# Patient Record
Sex: FEMALE | Race: WHITE | HISPANIC OR LATINO | Employment: FULL TIME | URBAN - METROPOLITAN AREA
[De-identification: names, ages, dates, MRNs, and addresses within clinical notes are randomized per-mention and may not be internally consistent; named-entity substitution may affect disease eponyms.]

---

## 2017-08-14 ENCOUNTER — OFFICE VISIT (OUTPATIENT)
Dept: URGENT CARE | Facility: PHYSICIAN GROUP | Age: 47
End: 2017-08-14
Payer: COMMERCIAL

## 2017-08-14 ENCOUNTER — HOSPITAL ENCOUNTER (OUTPATIENT)
Dept: RADIOLOGY | Facility: MEDICAL CENTER | Age: 47
End: 2017-08-14
Attending: FAMILY MEDICINE
Payer: COMMERCIAL

## 2017-08-14 VITALS
OXYGEN SATURATION: 96 % | BODY MASS INDEX: 29.99 KG/M2 | RESPIRATION RATE: 18 BRPM | HEART RATE: 82 BPM | WEIGHT: 180 LBS | TEMPERATURE: 98 F | HEIGHT: 65 IN | DIASTOLIC BLOOD PRESSURE: 82 MMHG | SYSTOLIC BLOOD PRESSURE: 108 MMHG

## 2017-08-14 DIAGNOSIS — M79.661 RIGHT CALF PAIN: ICD-10-CM

## 2017-08-14 DIAGNOSIS — M25.561 ACUTE PAIN OF RIGHT KNEE: ICD-10-CM

## 2017-08-14 DIAGNOSIS — M70.50 ANSERINE BURSITIS: ICD-10-CM

## 2017-08-14 PROCEDURE — 99214 OFFICE O/P EST MOD 30 MIN: CPT | Performed by: FAMILY MEDICINE

## 2017-08-14 PROCEDURE — 93971 EXTREMITY STUDY: CPT | Mod: RT

## 2017-08-14 PROCEDURE — 73564 X-RAY EXAM KNEE 4 OR MORE: CPT | Mod: RT

## 2017-08-14 NOTE — Clinical Note
August 14, 2017         Patient: Clara Coombs   YOB: 1970   Date of Visit: 8/14/2017           To Whom it May Concern:    Clara Coombs was seen in my clinic on 8/14/2017. Please excuse 8/15 and 8/16/2017.      Sincerely,           Ned John M.D.  Electronically Signed

## 2017-08-14 NOTE — MR AVS SNAPSHOT
"        Clara Coombs   2017 4:45 PM   Office Visit   MRN: [id number]    Department:  [facility]   Dept Phone:  [phone]    Description:  Female : 1970   Provider:  Ned John M.D.           Reason for Visit     Leg Swelling R leg swelling x2 wks, fell x2 months ago      Allergies as of 2017     No Known Allergies      You were diagnosed with     Acute pain of right knee   [5188071]       Right calf pain   [[id number]]         Vital Signs     Blood Pressure Pulse Temperature Respirations Height Weight    108/82 mmHg 82 36.7 °C (98 °F) 18 1.651 m (5' 5\") 81.647 kg (180 lb)    Body Mass Index Oxygen Saturation Smoking Status             29.95 kg/m2 96% Never Smoker          Basic Information     Date Of Birth Sex Race Ethnicity Preferred Language    1970 Female White  Origin (Rwandan,Nigerien,Turkish,Gab, etc) English      Your appointments     Aug 14, 2017  6:00 PM   US EXTREMITY (30) A with VISTA US 2   IMAGING VISTA ([place])    [address] [facility] [id number] [phone]              Health Maintenance        Date Due Completion Dates    IMM DTaP/Tdap/Td Vaccine (1 - Tdap) 1988 ---    PAP SMEAR 1990 ---    MAMMOGRAM 2017    IMM INFLUENZA (1) 2017 ---            Current Immunizations     No immunizations on file.      Below and/or attached are the medications your provider expects you to take. Review all of your home medications and newly ordered medications with your provider and/or pharmacist. Follow medication instructions as directed by your provider and/or pharmacist. Please keep your medication list with you and share with your provider. Update the information when medications are discontinued, doses are changed, or new medications (including over-the-counter products) are added; and carry medication information at all times in the event of emergency situations     Allergies:  No Known Allergies          Medications  Valid as " of: August 14, 2017 -  5:28 PM    Generic Name Brand Name Tablet Size Instructions for use    Tobramycin (Solution) TOBREX 0.3 % Place 1 Drop in both eyes 4 times a day.        .                 Medicines prescribed today were sent to:     [facility] [address] [place] [address], [place] - [address] [facility]    [address] [facility] [address]    Phone: [phone] Fax: [phone]    Open 24 Hours?: No      Medication refill instructions:       If your prescription bottle indicates you have medication refills left, it is not necessary to call your provider’s office. Please contact your pharmacy and they will refill your medication.    If your prescription bottle indicates you do not have any refills left, you may request refills at any time through one of the following ways: The online Robot App Store system (except Urgent Care), by calling your provider’s office, or by asking your pharmacy to contact your provider’s office with a refill request. Medication refills are processed only during regular business hours and may not be available until the next business day. Your provider may request additional information or to have a follow-up visit with you prior to refilling your medication.   *Please Note: Medication refills are assigned a new Rx number when refilled electronically. Your pharmacy may indicate that no refills were authorized even though a new prescription for the same medication is available at the pharmacy. Please request the medicine by name with the pharmacy before contacting your provider for a refill.        Your To Do List     Future Labs/Procedures Complete By Expires    DX-KNEE COMPLETE 4+ RIGHT  As directed 8/14/2018    US-EXTREMITY VENOUS UNILATERAL-LOWER RIGHT  As directed 8/14/2018         Robot App Store Access Code: [email or web address]  Expires: 9/13/2017  5:28 PM    Your email address is not on file at [facility].  Email Addresses are required for you to sign up for Robot App Store, please contact [phone]  to verify your personal information and to provide your email address prior to attempting to register for OfficeDropt[place] [address]    CourseNetworkinghart  A secure, online tool to manage your health information     [employer]’s VaxCare® is a secure, online tool that connects you to your personalized health information from the privacy of your home -- day or night - making it very easy for you to manage your healthcare. Once the activation process is completed, you can even access your medical information using the VaxCare millie, which is available for free in the Apple Millie store or Google Play store.     To learn more about VaxCare, visit [email or web address]    There are two levels of access available (as shown below):   My Chart Features  [facility] Primary Care Doctor [facility]  Specialists [facility]  Urgent  Care Non-[facility] Primary Care Doctor   Email your healthcare team securely and privately 24/7 X X X    Manage appointments: schedule your next appointment; view details of past/upcoming appointments X      Request prescription refills. X      View recent personal medical records, including lab and immunizations X X X X   View health record, including health history, allergies, medications X X X X   Read reports about your outpatient visits, procedures, consult and ER notes X X X X   See your discharge summary, which is a recap of your hospital and/or ER visit that includes your diagnosis, lab results, and care plan X X  X     How to register for OfficeDropt:  Once your e-mail address has been verified, follow the following steps to sign up for OfficeDropt.     1. Go to  [email or web address]  2. Click on the Sign Up Now box, which takes you to the New Member Sign Up page. You will need to provide the following information:  a. Enter your VaxCare Access Code exactly as it appears at the top of this page. (You will not need to use this code after you’ve completed the sign-up process. If you do  not sign up before the expiration date, you must request a new code.)   b. Enter your date of birth.   c. Enter your home email address.   d. Click Submit, and follow the next screen’s instructions.  3. Create a CloudTags ID. This will be your CloudTags login ID and cannot be changed, so think of one that is secure and easy to remember.  4. Create a Rystot password. You can change your password at any time.  5. Enter your Password Reset Question and Answer. This can be used at a later time if you forget your password.   6. Enter your e-mail address. This allows you to receive e-mail notifications when new information is available in CloudTags.  7. Click Sign Up. You can now view your health information.    For assistance activating your CloudTags account, call [phone]

## 2017-08-15 ASSESSMENT — ENCOUNTER SYMPTOMS
SENSORY CHANGE: 0
HEMOPTYSIS: 0
FOCAL WEAKNESS: 0
WEIGHT LOSS: 0
LEG SWELLING: 1

## 2017-08-15 NOTE — PROGRESS NOTES
"Subjective:      Clara Coombs is a 47 y.o. female who presents with Leg Swelling          Family member translating.   Leg Swelling  This is a new problem. Episode onset: 2 weeks medial knee pain, leg swelling. Twisting injury 2 months ago. Walks a significant amount daily. Pain is moderate in severity. No CP/SOB. No PMH/FH DVT/thrombophilia.  Pertinent negatives include no rash.       Review of Systems   Constitutional: Negative for weight loss and malaise/fatigue.   Respiratory: Negative for hemoptysis.    Musculoskeletal:        No hip or ankle pain   Skin: Negative for itching and rash.   Neurological: Negative for sensory change and focal weakness.   .  Medications, Allergies, and current problem list reviewed today in Epic         Objective:     /82 mmHg  Pulse 82  Temp(Src) 36.7 °C (98 °F)  Resp 18  Ht 1.651 m (5' 5\")  Wt 81.647 kg (180 lb)  BMI 29.95 kg/m2  SpO2 96%     Physical Exam   Constitutional: She appears well-developed and well-nourished. No distress.   Musculoskeletal:   Right knee: tender medial aspect proximal leg at anatomic location of anserine bursa. No fluctuance or fluid collection appreciated. No joint line tenderness. FROM. Stable. No effusion.     Right calf mildly tender and appear slightly swollen compared to left.    Neurological:   Speech is clear. Patient is appropriate and cooperative. C   Skin: Skin is warm and dry. No rash noted.               Assessment/Plan:   Xray knee negative  US Left lower extremity negative    1. Acute pain of right knee    - DX-KNEE COMPLETE 4+ RIGHT; Future    2. Right calf pain    - US-EXTREMITY VENOUS UNILATERAL-LOWER RIGHT; Future    3. Anserine bursitis    - REFERRAL TO SPORTS MEDICINE    Relative rest, ice, nsaid prn. Elevation and compression prn swelling. Resume activity as tolerated.  Differential diagnosis, natural history, supportive care, and indications for immediate follow-up discussed at length.         "

## 2017-08-21 ENCOUNTER — OFFICE VISIT (OUTPATIENT)
Dept: MEDICAL GROUP | Facility: CLINIC | Age: 47
End: 2017-08-21
Payer: COMMERCIAL

## 2017-08-21 VITALS
OXYGEN SATURATION: 95 % | BODY MASS INDEX: 29.99 KG/M2 | SYSTOLIC BLOOD PRESSURE: 116 MMHG | WEIGHT: 180 LBS | DIASTOLIC BLOOD PRESSURE: 78 MMHG | RESPIRATION RATE: 18 BRPM | TEMPERATURE: 97.5 F | HEIGHT: 65 IN | HEART RATE: 80 BPM

## 2017-08-21 DIAGNOSIS — M67.951 TENDINOPATHY OF RIGHT GLUTEUS MEDIUS: ICD-10-CM

## 2017-08-21 DIAGNOSIS — M22.2X1 PATELLOFEMORAL PAIN SYNDROME OF RIGHT KNEE: ICD-10-CM

## 2017-08-21 DIAGNOSIS — M62.81 QUADRICEPS WEAKNESS: ICD-10-CM

## 2017-08-21 PROCEDURE — 99203 OFFICE O/P NEW LOW 30 MIN: CPT | Performed by: FAMILY MEDICINE

## 2017-08-21 ASSESSMENT — PATIENT HEALTH QUESTIONNAIRE - PHQ9: CLINICAL INTERPRETATION OF PHQ2 SCORE: 0

## 2017-08-21 NOTE — MR AVS SNAPSHOT
"        Clara Coombs   2017 8:30 AM   Office Visit   MRN: [id number]    Department:  [facility]   Dept Phone:  [phone]    Description:  Female : 1970   Provider:  Abdon Baltazar M.D.           Reason for Visit     Knee Pain Referral from UC/ R knee bursitis       Allergies as of 2017     No Known Allergies      You were diagnosed with     Patellofemoral pain syndrome of right knee   [[id number]]       Tendinopathy of right gluteus medius   [[id number]]       Quadriceps weakness   [[id number]]         Vital Signs     Blood Pressure Pulse Temperature Respirations Height Weight    116/78 mmHg 80 36.4 °C (97.5 °F) 18 1.651 m (5' 5\") 81.647 kg (180 lb)    Body Mass Index Oxygen Saturation Smoking Status             29.95 kg/m2 95% Never Smoker          Basic Information     Date Of Birth Sex Race Ethnicity Preferred Language    1970 Female White  Origin (Turkish,Citizen of [place],Bulgarian,Anguillan, etc) Turkish      Health Maintenance        Date Due Completion Dates    IMM DTaP/Tdap/Td Vaccine (1 - Tdap) 1988 ---    PAP SMEAR 1990 ---    IMM INFLUENZA (1) 2017 ---    MAMMOGRAM 2017            Current Immunizations     No immunizations on file.      Below and/or attached are the medications your provider expects you to take. Review all of your home medications and newly ordered medications with your provider and/or pharmacist. Follow medication instructions as directed by your provider and/or pharmacist. Please keep your medication list with you and share with your provider. Update the information when medications are discontinued, doses are changed, or new medications (including over-the-counter products) are added; and carry medication information at all times in the event of emergency situations     Allergies:  No Known Allergies          Medications  Valid as of: 2017 -  9:06 AM    Generic Name Brand Name Tablet Size Instructions for use   " Tobramycin (Solution) TOBREX 0.3 % Place 1 Drop in both eyes 4 times a day.        .                 Medicines prescribed today were sent to:     [facility] [address] [place] [address] [address] [facility]    [address] [facility] [address]    Phone: [phone] Fax: [phone]    Open 24 Hours?: No      Medication refill instructions:       If your prescription bottle indicates you have medication refills left, it is not necessary to call your provider’s office. Please contact your pharmacy and they will refill your medication.    If your prescription bottle indicates you do not have any refills left, you may request refills at any time through one of the following ways: The online Biosensia system (except Urgent Care), by calling your provider’s office, or by asking your pharmacy to contact your provider’s office with a refill request. Medication refills are processed only during regular business hours and may not be available until the next business day. Your provider may request additional information or to have a follow-up visit with you prior to refilling your medication.   *Please Note: Medication refills are assigned a new Rx number when refilled electronically. Your pharmacy may indicate that no refills were authorized even though a new prescription for the same medication is available at the pharmacy. Please request the medicine by name with the pharmacy before contacting your provider for a refill.        Referral     A referral request has been sent to our patient care coordination department. Please allow 3-5 business days for us to process this request and contact you either by phone or mail. If you do not hear from us by the 5th business day, please call us at [phone].           Biosensia Access Code: [email or web address]  Expires: 9/13/2017  5:28 PM    Biosensia  A secure, online tool to manage your health information     [employer]’s Biosensia® is a secure, online tool that connects you to  your personalized health information from the privacy of your home -- day or night - making it very easy for you to manage your healthcare. Once the activation process is completed, you can even access your medical information using the Fabulyzer millie, which is available for free in the Apple Millie store or Google Play store.     Fabulyzer provides the following levels of access (as shown below):   My Chart Features   [facility] Primary Care Doctor [facility]  Specialists [facility]  Urgent  Care Non-[facility]  Primary Care  Doctor   Email your healthcare team securely and privately 24/7 X X X    Manage appointments: schedule your next appointment; view details of past/upcoming appointments X      Request prescription refills. X      View recent personal medical records, including lab and immunizations X X X X   View health record, including health history, allergies, medications X X X X   Read reports about your outpatient visits, procedures, consult and ER notes X X X X   See your discharge summary, which is a recap of your hospital and/or ER visit that includes your diagnosis, lab results, and care plan. X X       How to register for Fabulyzer:  1. Go to  [email or web address].  2. Click on the Sign Up Now box, which takes you to the New Member Sign Up page. You will need to provide the following information:  a. Enter your Fabulyzer Access Code exactly as it appears at the top of this page. (You will not need to use this code after you’ve completed the sign-up process. If you do not sign up before the expiration date, you must request a new code.)   b. Enter your date of birth.   c. Enter your home email address.   d. Click Submit, and follow the next screen’s instructions.  3. Create a Fabulyzer ID. This will be your Fabulyzer login ID and cannot be changed, so think of one that is secure and easy to remember.  4. Create a Fabulyzer password. You can change your password at any time.  5. Enter your Password Reset Question and Answer.  This can be used at a later time if you forget your password.   6. Enter your e-mail address. This allows you to receive e-mail notifications when new information is available in [employer].  7. Click Sign Up. You can now view your health information.    For assistance activating your [employer] account, call [phone]

## 2017-08-21 NOTE — PROGRESS NOTES
"CHIEF COMPLAINT:  Clara Coombs female presenting at the request of Ned John M.D. for evaluation of knee pain.     Clara Coombs is complaining of right knee pain  present for 2 months  Pain is at the anterior, anteromedial knee  Quality is aching, sharp, sharp pain is intermittent  Pain is non-radiating   Improved with resting  Aggravated by stairs, worse going up  no prior problems with this area in the past, previous surgery of RIGHT bunion surgery (2011)   Prior Treatments: none  Prior studies: X-Ray   Medications tried for pain include: ibuprofen (OTC)  Mechanical Symptom history: No Locking    REVIEW OF SYSTEMS  No Nausea, No Vomiting, No Chest Pain, No Shortness of Breath, No Dizziness, No Headache      PAST MEDICAL HISTORY:   History reviewed. No pertinent past medical history.    PMH:  has no past medical history on file.  MEDS:   Current outpatient prescriptions:   •  tobramycin (TOBREX) 0.3 % Solution ophthalmic solution, Place 1 Drop in both eyes 4 times a day., Disp: 1 Bottle, Rfl: 0  ALLERGIES: No Known Allergies  SURGHX:   Past Surgical History   Procedure Laterality Date   • Cholecystectomy     • Appendectomy     • Tubal coagulation laparoscopic bilateral     • Bunionectomy Right    • Primary c section       SOCHX:  reports that she has never smoked. She has never used smokeless tobacco. She reports that she drinks alcohol.  FH: Family history was reviewed, no pertinent findings to report     PHYSICAL EXAM:  /78 mmHg  Pulse 80  Temp(Src) 36.4 °C (97.5 °F)  Resp 18  Ht 1.651 m (5' 5\")  Wt 81.647 kg (180 lb)  BMI 29.95 kg/m2  SpO2 95%     slightly overweight in no apparent distress, alert and oriented x 3.  Gait: antalgic     RIGHT Knee:  Slight Varus and No Swelling  Range of Motion Intact  1+ effusion  Patellar Medial facet tenderness, Lateral facet tenderness and Extensor mechanism intact  Medial Joint Line Tenderness and NEGATIVE Carmen  Lateral Joint Line Non-tender and " NEGATIVE Carmen  Trace Laxity with Varus stress  Trace Laxity with Valgus stress  Lachman's testing is Trace  Posterior Drawer Testing is Trace  The leg is otherwise neurovascularly intact    LEFT Knee:  Slight Varus and No Swelling   Range of Motion Intact  Trace effusion  Patellar No tenderness and no apprehension  Medial Joint Line Non-tender and NEGATIVE Carmen  Lateral Joint Line Non-tender and NEGATIVE Carmen  Trace Laxity with Varus stress  Trace Laxity with Valgus stress  Lachman's testing is Trace  Posterior Drawer Testing is Trace  The leg is otherwise neurovascularly intact    Additional Findings: Tight hamstrings and Tender Gluteus medius      1. Patellofemoral pain syndrome of right knee  REFERRAL TO PHYSICAL THERAPY Reason for Therapy: Eval/Treat/Report   2. Tendinopathy of right gluteus medius     3. Quadriceps weakness       PT  Knee brace in office today  Demonstrated glut med strengthening in office today    Return in about 4 weeks (around 9/18/2017).                                                    Results for orders placed during the hospital encounter of 08/14/17   DX-KNEE COMPLETE 4+ RIGHT    Impression Unremarkable knee series.            INTERPRETING LOCATION:  [address] [place] [address], [id number]                                          done elsewhere and reviewed independently by me, demonstrates POSITIVE lateralization of the patella      Thank you Ned John M.D. for allowing me to participate in caring for your patient.      ADDENDUM:  September 11, 2017  Received notification from physical therapy that patient did not schedule

## 2020-02-26 ENCOUNTER — HOSPITAL ENCOUNTER (EMERGENCY)
Facility: MEDICAL CENTER | Age: 50
End: 2020-02-26
Attending: EMERGENCY MEDICINE
Payer: COMMERCIAL

## 2020-02-26 ENCOUNTER — APPOINTMENT (OUTPATIENT)
Dept: RADIOLOGY | Facility: MEDICAL CENTER | Age: 50
End: 2020-02-26
Attending: EMERGENCY MEDICINE
Payer: COMMERCIAL

## 2020-02-26 VITALS
BODY MASS INDEX: 31.39 KG/M2 | HEART RATE: 80 BPM | SYSTOLIC BLOOD PRESSURE: 140 MMHG | HEIGHT: 66 IN | DIASTOLIC BLOOD PRESSURE: 78 MMHG | WEIGHT: 195.33 LBS | TEMPERATURE: 98.7 F | OXYGEN SATURATION: 99 % | RESPIRATION RATE: 16 BRPM

## 2020-02-26 DIAGNOSIS — S09.90XA CLOSED HEAD INJURY, INITIAL ENCOUNTER: ICD-10-CM
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
## 2020-02-26 DIAGNOSIS — R07.9 CHEST PAIN, UNSPECIFIED TYPE: ICD-10-CM

## 2020-02-26 PROCEDURE — A9270 NON-COVERED ITEM OR SERVICE: HCPCS | Performed by: EMERGENCY MEDICINE

## 2020-02-26 PROCEDURE — 71045 X-RAY EXAM CHEST 1 VIEW: CPT

## 2020-02-26 PROCEDURE — 700102 HCHG RX REV CODE 250 W/ 637 OVERRIDE(OP): Performed by: EMERGENCY MEDICINE

## 2020-02-26 PROCEDURE — 99284 EMERGENCY DEPT VISIT MOD MDM: CPT

## 2020-02-26 RX ORDER — ACETAMINOPHEN 325 MG/1
975 TABLET ORAL ONCE
Status: COMPLETED | OUTPATIENT
Start: 2020-02-26 | End: 2020-02-26

## 2020-02-26 RX ADMIN — ACETAMINOPHEN 975 MG: 325 TABLET, FILM COATED ORAL at 21:02

## 2020-02-26 ASSESSMENT — PAIN DESCRIPTION - DESCRIPTORS: DESCRIPTORS: ACHING
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
## 2021-02-02 ENCOUNTER — TELEPHONE (OUTPATIENT)
Dept: SCHEDULING | Facility: IMAGING CENTER | Age: 51
End: 2021-02-02

## 2021-03-15 ENCOUNTER — TELEPHONE (OUTPATIENT)
Dept: MEDICAL GROUP | Facility: MEDICAL CENTER | Age: 51
End: 2021-03-15

## 2021-03-15 NOTE — TELEPHONE ENCOUNTER
VOICEMAIL  1. Caller Name: Clara Coombs       Call Back Number: There are no phone numbers on file.      2. Message: LVM regarding to her appointment on 3/29/21 with  to reschedule.

## 2021-03-22 ENCOUNTER — NON-PROVIDER VISIT (OUTPATIENT)
Dept: OCCUPATIONAL MEDICINE | Facility: CLINIC | Age: 51
End: 2021-03-22

## 2021-03-22 ENCOUNTER — OFFICE VISIT (OUTPATIENT)
Dept: OCCUPATIONAL MEDICINE | Facility: CLINIC | Age: 51
End: 2021-03-22

## 2021-03-22 VITALS
DIASTOLIC BLOOD PRESSURE: 82 MMHG | HEART RATE: 76 BPM | OXYGEN SATURATION: 98 % | HEIGHT: 65 IN | BODY MASS INDEX: 32.99 KG/M2 | SYSTOLIC BLOOD PRESSURE: 128 MMHG | TEMPERATURE: 97.8 F | RESPIRATION RATE: 16 BRPM | WEIGHT: 198 LBS

## 2021-03-22 DIAGNOSIS — Z02.1 PRE-EMPLOYMENT DRUG SCREENING: Primary | ICD-10-CM

## 2021-03-22 DIAGNOSIS — Z02.1 PRE-EMPLOYMENT HEALTH SCREENING EXAMINATION: ICD-10-CM

## 2021-03-22 LAB
AMP AMPHETAMINE: NORMAL
BREATH ALCOHOL COMMENT: 0
COC COCAINE: NORMAL
INT CON NEG: NORMAL
INT CON POS: NORMAL
MET METHAMPHETAMINES: NORMAL
OPI OPIATES: NORMAL
PCP PHENCYCLIDINE: NORMAL
POC BREATHALIZER: 0 PERCENT (ref 0–0.01)
POC DRUG COMMENT 753798-OCCUPATIONAL HEALTH: NORMAL
THC: NORMAL

## 2021-03-22 PROCEDURE — 82075 ASSAY OF BREATH ETHANOL: CPT | Performed by: NURSE PRACTITIONER

## 2021-03-22 PROCEDURE — 8915 PR COMPREHENSIVE PHYSICAL: Performed by: NURSE PRACTITIONER

## 2021-03-22 PROCEDURE — 92552 PURE TONE AUDIOMETRY AIR: CPT | Performed by: NURSE PRACTITIONER

## 2021-03-22 PROCEDURE — 80305 DRUG TEST PRSMV DIR OPT OBS: CPT | Performed by: NURSE PRACTITIONER

## 2022-04-29 ENCOUNTER — HOSPITAL ENCOUNTER (OUTPATIENT)
Dept: RADIOLOGY | Facility: MEDICAL CENTER | Age: 52
End: 2022-04-29
Attending: FAMILY MEDICINE
Payer: MEDICAID

## 2022-04-29 DIAGNOSIS — Z12.31 VISIT FOR SCREENING MAMMOGRAM: ICD-10-CM

## 2022-04-29 PROCEDURE — 77063 BREAST TOMOSYNTHESIS BI: CPT

## 2022-05-21 ENCOUNTER — OFFICE VISIT (OUTPATIENT)
Dept: URGENT CARE | Facility: PHYSICIAN GROUP | Age: 52
End: 2022-05-21
Payer: MEDICAID

## 2022-05-21 VITALS
WEIGHT: 162 LBS | SYSTOLIC BLOOD PRESSURE: 122 MMHG | DIASTOLIC BLOOD PRESSURE: 60 MMHG | BODY MASS INDEX: 25.43 KG/M2 | HEIGHT: 67 IN | HEART RATE: 77 BPM | RESPIRATION RATE: 16 BRPM | TEMPERATURE: 98.4 F | OXYGEN SATURATION: 98 %

## 2022-05-21 DIAGNOSIS — S29.012A MUSCLE STRAIN OF UPPER BACK: ICD-10-CM

## 2022-05-21 PROCEDURE — 99203 OFFICE O/P NEW LOW 30 MIN: CPT | Performed by: PHYSICIAN ASSISTANT

## 2022-05-21 RX ORDER — CYCLOBENZAPRINE HCL 5 MG
5-10 TABLET ORAL NIGHTLY PRN
Qty: 20 TABLET | Refills: 0 | Status: SHIPPED | OUTPATIENT
Start: 2022-05-21 | End: 2022-12-12

## 2022-05-21 RX ORDER — METHYLPREDNISOLONE 4 MG/1
TABLET ORAL
Qty: 21 TABLET | Refills: 0 | Status: SHIPPED | OUTPATIENT
Start: 2022-05-21 | End: 2022-06-03

## 2022-05-21 ASSESSMENT — ENCOUNTER SYMPTOMS
EYE DISCHARGE: 0
EYE REDNESS: 0
FEVER: 0
COUGH: 0
SHORTNESS OF BREATH: 0

## 2022-05-21 NOTE — PROGRESS NOTES
Subjective     Clara Coombs is a 51 y.o. female who presents with Shoulder Pain (L shoulder pain on and off x1year, feels inflamed and is tender)            This is a new problem.  The patient presents to clinic complaining of pain to her upper back and bilateral shoulders,  right greater than left.  The patient is accompanied by her daughter helps translate throughout the duration of today's encounter.  The patient states over the past year she has been experiencing pain to her upper back, as well as the posterior aspect of the bilateral shoulders -right greater than left.  The patient states over the past several weeks she has been experiencing more nervelike pain to the right posterior shoulder with some radiation of pain to her right neck and right upper arm.  The patient reports no recent injury or trauma to her upper back and/or bilateral shoulders.  She also reports no associated swelling, bruising, or redness.  The patient reports increased pain with lifting and movement.  She reports no numbness, tingling, weakness of the upper extremities.  She also reports no associated chest pain or shortness of breath.  The patient is taken OTC ibuprofen for her current symptoms.    Shoulder Pain  Pertinent negatives include no chest pain, congestion, coughing or fever.     PMH:  has no past medical history on file.  MEDS:   Current Outpatient Medications:   •  tobramycin (TOBREX) 0.3 % Solution ophthalmic solution, Place 1 Drop in both eyes 4 times a day., Disp: 1 Bottle, Rfl: 0  ALLERGIES: No Known Allergies  SURGHX:   Past Surgical History:   Procedure Laterality Date   • APPENDECTOMY     • BUNIONECTOMY Right    • CHOLECYSTECTOMY     • PRIMARY C SECTION     • TUBAL COAGULATION LAPAROSCOPIC BILATERAL       SOCHX:  reports that she has never smoked. She has never used smokeless tobacco. She reports current alcohol use.  FH: Family history was reviewed, no pertinent findings to report      Review of Systems  "  Constitutional: Negative for fever.   HENT: Negative for congestion.    Eyes: Negative for discharge and redness.   Respiratory: Negative for cough and shortness of breath.    Cardiovascular: Negative for chest pain.   Musculoskeletal: Positive for joint pain.              Objective     /60 (BP Location: Right arm, Patient Position: Sitting)   Pulse 77   Temp 36.9 °C (98.4 °F)   Resp 16   Ht 1.702 m (5' 7\")   Wt 73.5 kg (162 lb)   SpO2 98%   BMI 25.37 kg/m²      Physical Exam  Constitutional:       General: She is not in acute distress.     Appearance: Normal appearance. She is well-developed. She is not ill-appearing.   HENT:      Head: Normocephalic and atraumatic.      Right Ear: External ear normal.      Left Ear: External ear normal.   Eyes:      Extraocular Movements: Extraocular movements intact.      Conjunctiva/sclera: Conjunctivae normal.   Cardiovascular:      Rate and Rhythm: Normal rate.   Pulmonary:      Effort: Pulmonary effort is normal.   Musculoskeletal:      Cervical back: Normal range of motion and neck supple.      Comments:   Upper Back:  Tenderness to the bilateral paraspinal region of the upper back overlying the trapezius muscles with palpable muscle tension/spasm and slight tenderness extending to the right paraspinal region of the cervical spine.  No midline/bony tenderness.  No palpable step-off.  No swelling.  No ecchymosis.  No overlying erythema.  No increased warmth.  No additional tenderness to the bilateral shoulders.  ROM intact -the patient demonstrates full active range of motion of the bilateral shoulders, as well as the cervical spine  Neurovascular intact distally  Strength 5/5 -equal bilateral upper extremities   strength 5/5  Intrinsic muscles intact   Skin:     General: Skin is warm and dry.   Neurological:      Mental Status: She is alert and oriented to person, place, and time.                             Assessment & Plan          1. Muscle strain of " upper back  - methylPREDNISolone (MEDROL DOSEPAK) 4 MG Tablet Therapy Pack; Follow schedule on package instructions.  Dispense: 21 Tablet; Refill: 0  - cyclobenzaprine (FLEXERIL) 5 mg tablet; Take 1-2 Tablets by mouth at bedtime as needed for Muscle Spasms.  Dispense: 20 Tablet; Refill: 0  --Advised the patient to only take this medication at night, as it may cause drowsiness. Instructed the patient not to take the medication while at work, driving, or operating machinery.  Instructed the patient not to drink alcohol while taking this medication.     Differential diagnoses, supportive care, and indications for immediate follow-up discussed with patient.   Instructed to return to clinic or nearest emergency department for any change in condition, further concerns, or worsening of symptoms.    OTC NSAIDs for pain/discomfort  Alternate ice and heat to the affected area for symptomatic relief  Home stretches as discussed in clinic  Follow-up with PCP  Return to clinic or go to the ED if symptoms worsen or fail to improve, or if the patient should develop worsening/increasing back pain, neck pain, shoulder pain, radiation of pain, numbness, tingling, or weakness to the extremities, paresthesias, fever/chills, and/or any concerning symptoms.     Discussed plan with the patient, and she agrees to the above.     I personally reviewed prior external notes and test results pertinent to today's visit.  I have independently reviewed and interpreted all diagnostics ordered during this urgent care visit.       Please note that this dictation was created using voice recognition software. I have made every reasonable attempt to correct obvious errors, but I expect that there may be errors of grammar and possibly content that I did not discover before finalizing the note.     This note was electronically signed by Jeannie Perez PA-C

## 2022-06-03 ENCOUNTER — OFFICE VISIT (OUTPATIENT)
Dept: URGENT CARE | Facility: PHYSICIAN GROUP | Age: 52
End: 2022-06-03
Payer: MEDICAID

## 2022-06-03 ENCOUNTER — HOSPITAL ENCOUNTER (OUTPATIENT)
Dept: RADIOLOGY | Facility: MEDICAL CENTER | Age: 52
End: 2022-06-03
Attending: PHYSICIAN ASSISTANT
Payer: MEDICAID

## 2022-06-03 VITALS
HEIGHT: 64 IN | SYSTOLIC BLOOD PRESSURE: 126 MMHG | RESPIRATION RATE: 18 BRPM | BODY MASS INDEX: 27.66 KG/M2 | DIASTOLIC BLOOD PRESSURE: 84 MMHG | TEMPERATURE: 97.6 F | HEART RATE: 73 BPM | WEIGHT: 162 LBS | OXYGEN SATURATION: 97 %

## 2022-06-03 DIAGNOSIS — M25.511 ACUTE PAIN OF RIGHT SHOULDER: ICD-10-CM

## 2022-06-03 PROBLEM — T78.40XA ALLERGY: Status: ACTIVE | Noted: 2020-01-02

## 2022-06-03 PROBLEM — E55.9 VITAMIN D DEFICIENCY: Status: ACTIVE | Noted: 2022-03-18

## 2022-06-03 PROBLEM — N92.1 MENOMETRORRHAGIA: Status: ACTIVE | Noted: 2022-04-29

## 2022-06-03 PROBLEM — D50.8 IRON DEFICIENCY ANEMIA SECONDARY TO INADEQUATE DIETARY IRON INTAKE: Status: ACTIVE | Noted: 2022-04-29

## 2022-06-03 PROCEDURE — 73030 X-RAY EXAM OF SHOULDER: CPT | Mod: RT

## 2022-06-03 PROCEDURE — 20610 DRAIN/INJ JOINT/BURSA W/O US: CPT | Performed by: PHYSICIAN ASSISTANT

## 2022-06-03 RX ORDER — FLUTICASONE PROPIONATE 50 MCG
2 SPRAY, SUSPENSION (ML) NASAL DAILY
COMMUNITY
Start: 2022-04-08 | End: 2022-12-12

## 2022-06-03 RX ORDER — LORATADINE 10 MG/1
10 TABLET ORAL DAILY
COMMUNITY
Start: 2022-04-08 | End: 2022-12-12

## 2022-06-03 ASSESSMENT — ENCOUNTER SYMPTOMS: NEUROLOGICAL NEGATIVE: 1

## 2022-06-03 NOTE — PROGRESS NOTES
"  Subjective:     Calra Coombs  is a 51 y.o. female who presents for Shoulder Pain (Bi lateral shoulder pain and hard to lift right arm after fall (seen a couple weeks ago here))       He presents today, with her daughter to assist with translation, for right shoulder pain that has been ongoing since a fall that occurred on 5/20/2022.  Since that time she has been experiencing pain over the right shoulder, right elbow, right upper trap and right scapular region.  She denies changes in sensation over the right upper extremity.  Denies changes in vascular function of the right upper extremity.  She does have limited range of motion at the right shoulder and right elbow due to pain.  Has been taking over-the-counter medications for symptom relief.  She was seen in the urgent care on 5/21/2022 for these same symptoms, at that time a steroid pack was prescribed which did provide benefit while the patient was taking it but the symptom benefit has worn off.     Review of Systems   Musculoskeletal: Positive for joint pain (Right shoulder, right elbow).   Neurological: Negative.       No Known Allergies  No past medical history on file.     Objective:   /84 (BP Location: Left arm, Patient Position: Sitting, BP Cuff Size: Adult)   Pulse 73   Temp 36.4 °C (97.6 °F) (Temporal)   Resp 18   Ht 1.626 m (5' 4\")   Wt 73.5 kg (162 lb)   SpO2 97%   BMI 27.81 kg/m²   Physical Exam  Vitals and nursing note reviewed.   Constitutional:       General: She is not in acute distress.     Appearance: She is not ill-appearing or toxic-appearing.   HENT:      Head: Normocephalic.      Nose: No rhinorrhea.   Eyes:      General: Scleral icterus present.      Conjunctiva/sclera: Conjunctivae normal.   Pulmonary:      Effort: Pulmonary effort is normal. No respiratory distress.      Breath sounds: No stridor.   Musculoskeletal:      Cervical back: Neck supple.      Comments: Limited abduction and flexion of the right shoulder due to " pain.  Reduced strength of the right bicep, right deltoid, right tricep musculature when compared to the contralateral side.  Unable to perform [place]'s, speeds test, empty can due to pain.  There is tenderness to palpation over the supraspinatus, infraspinatus, teres minor, deltoid, biceps, upper trapezius muscles   Neurological:      Mental Status: She is alert and oriented to person, place, and time.   Psychiatric:         Mood and Affect: Mood normal.         Behavior: Behavior normal.         Thought Content: Thought content normal.         Judgment: Judgment normal.             Diagnostic testing:    Right shoulder x-ray  IMPRESSION:  1.  No fracture or dislocation of RIGHT shoulder.  2.  Mild degenerative changes.    Assessment/Plan:     Encounter Diagnoses   Name Primary?   • Acute pain of right shoulder      Procedure: Subacromial injection of the right shoulder, using posterior approach  -Risks, benefits, and alternatives discussed. Risks include infection, bleeding, nerve damage  -Bony landmarks were identified and injection site was marked  -Injection site was cleansed with Betadine swabs  -Area was injected with 2 cc of 1% lidocaine without epinephrine, 2 cc of Kenalog.  Aspiration was performed prior to injection of medications.  Medications were injected into the subacromial space without resistance  -The patient tolerated the procedure well     Plan for care for today's complaint includes Referral to sports medicine for further evaluation management of this condition.  Her x-ray did not show any fracture or dislocation of the shoulder, based on her symptom history, physical exam I do feel that her symptoms may be related to a rotator cuff past allergy or subacromial bursitis/impingement.  We did perform a subacromial injection on the right shoulder, please see the procedure details above for further information.  Over-the-counter medications can be taken for symptom relief.  She should avoid  repetitive pushing, pulling, lifting activities and avoid heavy lifting with the right shoulder.    See AVS Instructions below for written guidance provided to patient on after-visit management and care in addition to our verbal discussion during the visit.    Please note that this dictation was created using voice recognition software. I have attempted to correct all errors, but there may be sound-alike, spelling, grammar and possibly content errors that I did not discover before finalizing the note.    Bal Carina SINGH

## 2022-12-12 ENCOUNTER — OFFICE VISIT (OUTPATIENT)
Dept: URGENT CARE | Facility: CLINIC | Age: 52
End: 2022-12-12
Payer: COMMERCIAL

## 2022-12-12 VITALS
RESPIRATION RATE: 14 BRPM | OXYGEN SATURATION: 96 % | HEIGHT: 62 IN | BODY MASS INDEX: 35.88 KG/M2 | SYSTOLIC BLOOD PRESSURE: 100 MMHG | WEIGHT: 195 LBS | TEMPERATURE: 97.6 F | HEART RATE: 68 BPM | DIASTOLIC BLOOD PRESSURE: 72 MMHG

## 2022-12-12 DIAGNOSIS — M54.50 ACUTE LEFT-SIDED LOW BACK PAIN WITHOUT SCIATICA: ICD-10-CM

## 2022-12-12 DIAGNOSIS — M76.32 IT BAND SYNDROME, LEFT: ICD-10-CM

## 2022-12-12 PROCEDURE — 99213 OFFICE O/P EST LOW 20 MIN: CPT | Performed by: NURSE PRACTITIONER

## 2022-12-12 RX ORDER — MELOXICAM 15 MG/1
15 TABLET ORAL DAILY
Qty: 30 TABLET | Refills: 0 | Status: SHIPPED | OUTPATIENT
Start: 2022-12-12 | End: 2023-06-13 | Stop reason: SDUPTHER

## 2022-12-12 ASSESSMENT — ENCOUNTER SYMPTOMS
FATIGUE: 0
SHORTNESS OF BREATH: 0
NUMBNESS: 0
BACK PAIN: 1
COUGH: 0
EYE REDNESS: 0
SORE THROAT: 0
NAUSEA: 0
VOMITING: 0
FEVER: 0
WEAKNESS: 0
DIZZINESS: 0
LEG PAIN: 1
MYALGIAS: 0
CHILLS: 0
JOINT SWELLING: 0
ABDOMINAL PAIN: 0

## 2022-12-12 NOTE — LETTER
December 12, 2022         Patient: Clara Coombs   YOB: 1970   Date of Visit: 12/12/2022           To Whom it May Concern:    Clara Coombs was seen in my clinic on 12/12/2022. She may return to work on 12/14/22.    If you have any questions or concerns, please don't hesitate to call.        Sincerely,           DALIA Ramsey.  Electronically Signed

## 2022-12-13 NOTE — PROGRESS NOTES
Subjective:   Clara Coombs is a 52 y.o. female who presents for Leg Pain (X 1 month from L Hip radiating down to L foot with low back pain on L side and sometimes on R knee to L foot. No known injury but fell on back a year ago. Pt. Would like a referral to PCP. )    Patient Sinhala-speaking only son translating for encounter  Leg Pain  This is a new problem. The current episode started more than 1 month ago. The problem occurs constantly. The problem has been gradually worsening. Pertinent negatives include no abdominal pain, chest pain, chills, coughing, fatigue, fever, joint swelling, myalgias, nausea, numbness, rash, sore throat, vomiting or weakness. The symptoms are aggravated by walking and twisting. She has tried acetaminophen, NSAIDs and rest for the symptoms. The treatment provided no relief.     Review of Systems   Constitutional:  Negative for chills, fatigue and fever.   HENT:  Negative for sore throat.    Eyes:  Negative for redness.   Respiratory:  Negative for cough and shortness of breath.    Cardiovascular:  Negative for chest pain.   Gastrointestinal:  Negative for abdominal pain, nausea and vomiting.   Genitourinary:  Negative for dysuria.   Musculoskeletal:  Positive for back pain. Negative for joint swelling and myalgias.        Left hip/leg pain     Skin:  Negative for rash.   Neurological:  Negative for dizziness, weakness and numbness.     Medications:    meloxicam    Allergies: Patient has no known allergies.    Problem List: Clara Coombs does not have any pertinent problems on file.    Surgical History:  Past Surgical History:   Procedure Laterality Date    APPENDECTOMY      BUNIONECTOMY Right     CHOLECYSTECTOMY      PRIMARY C SECTION      TUBAL COAGULATION LAPAROSCOPIC BILATERAL         Past Social Hx: Clara Coombs  reports that she has never smoked. She has never used smokeless tobacco. She reports current alcohol use.     Past Family Hx:  Clara Coombs family history is not on file.  "    Problem list, medications, and allergies reviewed by myself today in Epic.     Objective:     /72   Pulse 68   Temp 36.4 °C (97.6 °F) (Temporal)   Resp 14   Ht 1.575 m (5' 2\")   Wt 88.5 kg (195 lb)   LMP 10/03/2022   SpO2 96%   BMI 35.67 kg/m²     Physical Exam  Vitals and nursing note reviewed.   Constitutional:       General: She is not in acute distress.     Appearance: She is well-developed.   HENT:      Head: Normocephalic and atraumatic.      Right Ear: External ear normal.      Left Ear: External ear normal.      Nose: Nose normal.      Mouth/Throat:      Mouth: Mucous membranes are moist.   Eyes:      Conjunctiva/sclera: Conjunctivae normal.   Cardiovascular:      Rate and Rhythm: Normal rate.   Pulmonary:      Effort: Pulmonary effort is normal. No respiratory distress.      Breath sounds: Normal breath sounds.   Abdominal:      General: There is no distension.   Musculoskeletal:         General: Normal range of motion.      Cervical back: Normal.      Thoracic back: Normal.      Lumbar back: Spasms and tenderness present. Negative right straight leg raise test and negative left straight leg raise test.        Back:       Left hip: Tenderness present.      Left upper leg: Tenderness present. No swelling, edema or bony tenderness.      Left knee: Normal.      Left lower leg: Tenderness present.        Legs:       Comments: Tenderness palpation with tightness along IT band   Skin:     General: Skin is warm and dry.   Neurological:      General: No focal deficit present.      Mental Status: She is alert and oriented to person, place, and time. Mental status is at baseline.      Gait: Gait (gait at baseline) normal.   Psychiatric:         Judgment: Judgment normal.       Assessment/Plan:     Diagnosis and associated orders:     1. It band syndrome, left  meloxicam (MOBIC) 15 MG tablet    Referral to Sports Medicine    Referral to Physical Therapy      2. Acute left-sided low back pain without " sciatica  Referral to Physical Therapy           Comments/MDM:     I personally reviewed prior external notes and prior test results pertinent to today's visit.  Patient with no acute trauma or injury x-ray imaging not indicated on today's exam.  Encouraged stretching, heat, massage, gentle range of motion.  We will place referral to physical therapy.  We will follow-up with sports medicine if pain persist.  Discussed management options, risks and benefits, and alternatives to treatment plan agreed upon.   Red flags discussed and indications to immediately call 911 or present to the Emergency Department.   Supportive care, differential diagnoses, and indications for immediate follow-up discussed with patient.    Patient expresses understanding and agrees to plan. Patient denies any other questions or concerns.                  Please note that this dictation was created using voice recognition software. I have made a reasonable attempt to correct obvious errors, but I expect that there are errors of grammar and possibly content that I did not discover before finalizing the note.    This note was electronically signed by Lv VERMA.

## 2023-01-13 ENCOUNTER — APPOINTMENT (OUTPATIENT)
Dept: MEDICAL GROUP | Facility: PHYSICIAN GROUP | Age: 53
End: 2023-01-13
Payer: COMMERCIAL

## 2023-02-10 ENCOUNTER — APPOINTMENT (OUTPATIENT)
Dept: MEDICAL GROUP | Facility: PHYSICIAN GROUP | Age: 53
End: 2023-02-10
Payer: COMMERCIAL

## 2023-04-06 ENCOUNTER — TELEPHONE (OUTPATIENT)
Dept: MEDICAL GROUP | Facility: PHYSICIAN GROUP | Age: 53
End: 2023-04-06
Payer: COMMERCIAL

## 2023-04-07 ENCOUNTER — TELEPHONE (OUTPATIENT)
Dept: HEALTH INFORMATION MANAGEMENT | Facility: OTHER | Age: 53
End: 2023-04-07

## 2023-04-12 DIAGNOSIS — Z13.220 SCREENING FOR LIPID DISORDERS: ICD-10-CM

## 2023-04-12 DIAGNOSIS — E66.3 OVERWEIGHT (BMI 25.0-29.9): ICD-10-CM

## 2023-04-12 DIAGNOSIS — E55.9 VITAMIN D DEFICIENCY: ICD-10-CM

## 2023-04-12 DIAGNOSIS — Z00.00 ROUTINE HEALTH MAINTENANCE: ICD-10-CM

## 2023-04-12 NOTE — PROGRESS NOTES
1. Vitamin D deficiency  - VITAMIN D,25 HYDROXY (DEFICIENCY); Future    2. Screening for lipid disorders  - Comp Metabolic Panel; Future  - Lipid Profile; Future  - TSH WITH REFLEX TO FT4; Future    3. Routine health maintenance  - CBC WITH DIFFERENTIAL; Future  - Comp Metabolic Panel; Future  - Lipid Profile; Future  - VITAMIN D,25 HYDROXY (DEFICIENCY); Future  - TSH WITH REFLEX TO FT4; Future    4. Overweight (BMI 25.0-29.9)  - CBC WITH DIFFERENTIAL; Future  - Comp Metabolic Panel; Future  - Lipid Profile; Future  - TSH WITH REFLEX TO FT4; Future     Patient requesting to complete annual labs prior to establishing appointment on 5/8/2023.

## 2023-05-13 ENCOUNTER — TELEPHONE (OUTPATIENT)
Dept: URGENT CARE | Facility: PHYSICIAN GROUP | Age: 53
End: 2023-05-13
Payer: COMMERCIAL

## 2023-06-05 ENCOUNTER — HOSPITAL ENCOUNTER (OUTPATIENT)
Dept: LAB | Facility: MEDICAL CENTER | Age: 53
End: 2023-06-05
Attending: NURSE PRACTITIONER
Payer: COMMERCIAL

## 2023-06-05 DIAGNOSIS — Z13.220 SCREENING FOR LIPID DISORDERS: ICD-10-CM

## 2023-06-05 DIAGNOSIS — Z00.00 ROUTINE HEALTH MAINTENANCE: ICD-10-CM

## 2023-06-05 DIAGNOSIS — E55.9 VITAMIN D DEFICIENCY: ICD-10-CM

## 2023-06-05 DIAGNOSIS — E66.3 OVERWEIGHT (BMI 25.0-29.9): ICD-10-CM

## 2023-06-05 LAB
25(OH)D3 SERPL-MCNC: 33 NG/ML (ref 30–100)
ALBUMIN SERPL BCP-MCNC: 4 G/DL (ref 3.2–4.9)
ALBUMIN/GLOB SERPL: 1.6 G/DL
ALP SERPL-CCNC: 76 U/L (ref 30–99)
ALT SERPL-CCNC: 27 U/L (ref 2–50)
ANION GAP SERPL CALC-SCNC: 11 MMOL/L (ref 7–16)
AST SERPL-CCNC: 22 U/L (ref 12–45)
BASOPHILS # BLD AUTO: 0.6 % (ref 0–1.8)
BASOPHILS # BLD: 0.03 K/UL (ref 0–0.12)
BILIRUB SERPL-MCNC: 0.6 MG/DL (ref 0.1–1.5)
BUN SERPL-MCNC: 20 MG/DL (ref 8–22)
CALCIUM ALBUM COR SERPL-MCNC: 9.1 MG/DL (ref 8.5–10.5)
CALCIUM SERPL-MCNC: 9.1 MG/DL (ref 8.5–10.5)
CHLORIDE SERPL-SCNC: 109 MMOL/L (ref 96–112)
CHOLEST SERPL-MCNC: 158 MG/DL (ref 100–199)
CO2 SERPL-SCNC: 24 MMOL/L (ref 20–33)
CREAT SERPL-MCNC: 0.53 MG/DL (ref 0.5–1.4)
EOSINOPHIL # BLD AUTO: 0.33 K/UL (ref 0–0.51)
EOSINOPHIL NFR BLD: 7.1 % (ref 0–6.9)
ERYTHROCYTE [DISTWIDTH] IN BLOOD BY AUTOMATED COUNT: 42.5 FL (ref 35.9–50)
FASTING STATUS PATIENT QL REPORTED: NORMAL
GFR SERPLBLD CREATININE-BSD FMLA CKD-EPI: 110 ML/MIN/1.73 M 2
GLOBULIN SER CALC-MCNC: 2.5 G/DL (ref 1.9–3.5)
GLUCOSE SERPL-MCNC: 96 MG/DL (ref 65–99)
HCT VFR BLD AUTO: 44.8 % (ref 37–47)
HDLC SERPL-MCNC: 51 MG/DL
HGB BLD-MCNC: 14.8 G/DL (ref 12–16)
IMM GRANULOCYTES # BLD AUTO: 0.01 K/UL (ref 0–0.11)
IMM GRANULOCYTES NFR BLD AUTO: 0.2 % (ref 0–0.9)
LDLC SERPL CALC-MCNC: 80 MG/DL
LYMPHOCYTES # BLD AUTO: 1.32 K/UL (ref 1–4.8)
LYMPHOCYTES NFR BLD: 28.3 % (ref 22–41)
MCH RBC QN AUTO: 31.5 PG (ref 27–33)
MCHC RBC AUTO-ENTMCNC: 33 G/DL (ref 32.2–35.5)
MCV RBC AUTO: 95.3 FL (ref 81.4–97.8)
MONOCYTES # BLD AUTO: 0.48 K/UL (ref 0–0.85)
MONOCYTES NFR BLD AUTO: 10.3 % (ref 0–13.4)
NEUTROPHILS # BLD AUTO: 2.49 K/UL (ref 1.82–7.42)
NEUTROPHILS NFR BLD: 53.5 % (ref 44–72)
NRBC # BLD AUTO: 0 K/UL
NRBC BLD-RTO: 0 /100 WBC (ref 0–0.2)
PLATELET # BLD AUTO: 287 K/UL (ref 164–446)
PMV BLD AUTO: 10 FL (ref 9–12.9)
POTASSIUM SERPL-SCNC: 4.1 MMOL/L (ref 3.6–5.5)
PROT SERPL-MCNC: 6.5 G/DL (ref 6–8.2)
RBC # BLD AUTO: 4.7 M/UL (ref 4.2–5.4)
SODIUM SERPL-SCNC: 144 MMOL/L (ref 135–145)
TRIGL SERPL-MCNC: 134 MG/DL (ref 0–149)
TSH SERPL DL<=0.005 MIU/L-ACNC: 1.62 UIU/ML (ref 0.38–5.33)
WBC # BLD AUTO: 4.7 K/UL (ref 4.8–10.8)

## 2023-06-05 PROCEDURE — 36415 COLL VENOUS BLD VENIPUNCTURE: CPT

## 2023-06-05 PROCEDURE — 85025 COMPLETE CBC W/AUTO DIFF WBC: CPT

## 2023-06-05 PROCEDURE — 80053 COMPREHEN METABOLIC PANEL: CPT

## 2023-06-05 PROCEDURE — 84443 ASSAY THYROID STIM HORMONE: CPT

## 2023-06-05 PROCEDURE — 82306 VITAMIN D 25 HYDROXY: CPT

## 2023-06-05 PROCEDURE — 80061 LIPID PANEL: CPT

## 2023-06-13 ENCOUNTER — HOSPITAL ENCOUNTER (OUTPATIENT)
Dept: RADIOLOGY | Facility: MEDICAL CENTER | Age: 53
End: 2023-06-13
Attending: NURSE PRACTITIONER
Payer: COMMERCIAL

## 2023-06-13 ENCOUNTER — OFFICE VISIT (OUTPATIENT)
Dept: MEDICAL GROUP | Facility: PHYSICIAN GROUP | Age: 53
End: 2023-06-13
Payer: COMMERCIAL

## 2023-06-13 VITALS
TEMPERATURE: 98.4 F | HEART RATE: 88 BPM | BODY MASS INDEX: 39.01 KG/M2 | WEIGHT: 212 LBS | SYSTOLIC BLOOD PRESSURE: 122 MMHG | OXYGEN SATURATION: 99 % | DIASTOLIC BLOOD PRESSURE: 74 MMHG | HEIGHT: 62 IN

## 2023-06-13 DIAGNOSIS — M25.50 POLYARTHRALGIA: ICD-10-CM

## 2023-06-13 DIAGNOSIS — Z12.12 SCREENING FOR COLORECTAL CANCER: ICD-10-CM

## 2023-06-13 DIAGNOSIS — D50.8 IRON DEFICIENCY ANEMIA SECONDARY TO INADEQUATE DIETARY IRON INTAKE: ICD-10-CM

## 2023-06-13 DIAGNOSIS — M79.671 PAIN IN BOTH FEET: ICD-10-CM

## 2023-06-13 DIAGNOSIS — M79.672 PAIN IN BOTH FEET: ICD-10-CM

## 2023-06-13 DIAGNOSIS — Z23 NEED FOR VACCINATION: ICD-10-CM

## 2023-06-13 DIAGNOSIS — Z12.11 SCREENING FOR COLORECTAL CANCER: ICD-10-CM

## 2023-06-13 DIAGNOSIS — Z76.89 ESTABLISHING CARE WITH NEW DOCTOR, ENCOUNTER FOR: ICD-10-CM

## 2023-06-13 DIAGNOSIS — E55.9 VITAMIN D DEFICIENCY: ICD-10-CM

## 2023-06-13 PROCEDURE — 3074F SYST BP LT 130 MM HG: CPT | Performed by: NURSE PRACTITIONER

## 2023-06-13 PROCEDURE — 73100 X-RAY EXAM OF WRIST: CPT | Mod: LT

## 2023-06-13 PROCEDURE — 90471 IMMUNIZATION ADMIN: CPT | Performed by: NURSE PRACTITIONER

## 2023-06-13 PROCEDURE — 73630 X-RAY EXAM OF FOOT: CPT | Mod: RT

## 2023-06-13 PROCEDURE — 90715 TDAP VACCINE 7 YRS/> IM: CPT | Performed by: NURSE PRACTITIONER

## 2023-06-13 PROCEDURE — 99214 OFFICE O/P EST MOD 30 MIN: CPT | Mod: 25 | Performed by: NURSE PRACTITIONER

## 2023-06-13 PROCEDURE — 73562 X-RAY EXAM OF KNEE 3: CPT | Mod: LT

## 2023-06-13 PROCEDURE — 73100 X-RAY EXAM OF WRIST: CPT | Mod: RT

## 2023-06-13 PROCEDURE — 3078F DIAST BP <80 MM HG: CPT | Performed by: NURSE PRACTITIONER

## 2023-06-13 PROCEDURE — 73562 X-RAY EXAM OF KNEE 3: CPT | Mod: RT

## 2023-06-13 RX ORDER — MELOXICAM 15 MG/1
15 TABLET ORAL DAILY
Qty: 90 TABLET | Refills: 3 | Status: SHIPPED | OUTPATIENT
Start: 2023-06-13 | End: 2024-03-08 | Stop reason: SDUPTHER

## 2023-06-13 ASSESSMENT — FIBROSIS 4 INDEX: FIB4 SCORE: 0.78

## 2023-06-13 ASSESSMENT — PATIENT HEALTH QUESTIONNAIRE - PHQ9: CLINICAL INTERPRETATION OF PHQ2 SCORE: 0

## 2023-06-14 PROBLEM — N92.1 MENOMETRORRHAGIA: Status: RESOLVED | Noted: 2022-04-29 | Resolved: 2023-06-14

## 2023-06-14 PROBLEM — M17.0 PRIMARY OSTEOARTHRITIS OF BOTH KNEES: Status: ACTIVE | Noted: 2023-06-14

## 2023-06-14 RX ORDER — MULTIVIT-MIN/IRON/FOLIC ACID/K 18-600-40
2000 CAPSULE ORAL DAILY
COMMUNITY

## 2023-06-14 NOTE — ASSESSMENT & PLAN NOTE
New to examiner, chronic for patient. Patient with history of right foot bunionectomy 15 years ago. Reports that she is experiencing bilateral foot pain, right worse than left. She is not sure if it is due to her job and being on her feet all day. Interested in a referral to a podiatrist.

## 2023-06-14 NOTE — ASSESSMENT & PLAN NOTE
New to examiner, chronic for patient. Continues vitamin d 2000 units daily. Due for annual labs in June 2024.

## 2023-06-14 NOTE — PROGRESS NOTES
CC:   Chief Complaint   Patient presents with    Establish Care     Lab sierra     HISTORY OF THE PRESENT ILLNESS: Patient is a 52 y.o. female. This pleasant patient is here today to establish care and discuss multiple issues as listed below.     Health Maintenance: Reviewed    Vitamin D deficiency  New to examiner, chronic for patient. Continues vitamin d 2000 units daily. Due for annual labs in June 2024.    Iron deficiency anemia secondary to inadequate dietary iron intake  New to examiner, chronic for patient. Currently taking OTC iron supplement. CBC stable. Due for annual labs in June 2024    Pain in both feet  New to examiner, chronic for patient. Patient with history of right foot bunionectomy 15 years ago. Reports that she is experiencing bilateral foot pain, right worse than left. She is not sure if it is due to her job and being on her feet all day. Interested in a referral to a podiatrist.    Polyarthralgia  New to examiner, chronic for patient. Reports having pain in right shoulder, bilateral elbows, wrists, and knees. States that the aches started about 13 years ago after having her last child. Her wrists and knees are the most bothersome. 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 Was prescribed meloxicam for sciatica in the past and this was helpful for her pain, tylenol does not help.    Allergies: Patient has no known allergies.  Current Outpatient Medications Ordered in Epic   Medication Sig Dispense Refill    Cholecalciferol (VITAMIN D) 50 MCG (2000 UT) Cap Take 2,000 Units by mouth every day.      meloxicam (MOBIC) 15 MG tablet Take 1 Tablet by mouth every day. 90 Tablet 3     No current Epic-ordered facility-administered medications on file.     Past Medical History:   Diagnosis Date    Anemia     Menometrorrhagia 4/29/2022    Vitamin D deficiency      Past Surgical History:   Procedure Laterality Date    APPENDECTOMY      BUNIONECTOMY Right     CHOLECYSTECTOMY       PRIMARY C SECTION      TUBAL COAGULATION LAPAROSCOPIC BILATERAL       Social History     Tobacco Use    Smoking status: Never     Passive exposure: Current    Smokeless tobacco: Never    Tobacco comments:     Works in a [employer]   Vaping Use    Vaping Use: Never used   Substance Use Topics    Alcohol use: Not Currently     Comment: occasional 1-2 drinks - holidays    Drug use: Never     Social History     Social History Narrative    Not on file     Family History   Problem Relation Age of Onset    Diabetes Mother     No Known Problems Father     Genitourinary () Problems Sister         heavy periods    No Known Problems Sister     No Known Problems Maternal Uncle     No Known Problems Maternal Uncle     No Known Problems Maternal Uncle     No Known Problems Maternal Uncle     No Known Problems Paternal Aunt     No Known Problems Paternal Aunt     No Known Problems Paternal Uncle     No Known Problems Paternal Uncle     No Known Problems Paternal Uncle     No Known Problems Paternal Uncle     No Known Problems Paternal Uncle     No Known Problems Paternal Uncle     Diabetes Maternal Grandmother     Cancer Maternal Grandfather     Colon Cancer Maternal Grandfather     No Known Problems Paternal Grandmother     No Known Problems Paternal Grandfather     No Known Problems Daughter     Colon Cancer Daughter         faints with menses    No Known Problems Daughter     Seizures Son     No Known Problems Son     No Known Problems Son      ROS:   Constitutional: No fevers, chills, malaise/fatigue.  Eyes: No eye pain.  ENT: No sore throat, congestion.   Resp: No cough, shortness of breath.  CV: No chest pain, leg swelling, palpitations.  GI: No nausea/vomiting, abdominal pain, constipation, diarrhea.  : No dysuria, hematuria.  MSK: + polyarthralgia. No weakness.  Skin: No rashes.  Neuro: No dizziness, weakness, headaches.  Psych: No suicidal ideations.    All remaining systems reviewed and found to be negative, except as  "stated above.        Exam: /74 (BP Location: Left arm, Patient Position: Sitting, BP Cuff Size: Large adult)   Pulse 88   Temp 36.9 °C (98.4 °F) (Temporal)   Ht 1.581 m (5' 2.25\")   Wt 96.2 kg (212 lb)   SpO2 99%  Body mass index is 38.46 kg/m².    General: Normal appearing. No distress.  HEENT: Normocephalic. Eyes conjunctiva clear lids without ptosis, pupils equal and reactive to light accommodation, ears normal shape and contour, canals are clear bilaterally, tympanic membranes are benign, nasal mucosa benign, oropharynx is without erythema, edema or exudates. Sinuses (frontal and maxillary) nontender to palpation.  Neck: Supple without JVD or bruit. Thyroid is not enlarged.  Pulmonary: Clear to ausculation.  Normal effort. No rales, rhonchi, or wheezing.  Cardiovascular: Regular rate and rhythm without murmur. Carotid and radial pulses are intact and equal bilaterally.  Abdomen: Soft, nontender, nondistended. Normal bowel sounds. Liver and spleen are not palpable.  Neurologic: Grossly nonfocal.  Lymph: No cervical, supraclavicular or axillary lymph nodes are palpable.  Skin: Warm and dry.  No obvious lesions.  Musculoskeletal: Normal gait. No extremity cyanosis, clubbing, or edema.  Psych: Normal mood and affect. Alert and oriented x3. Judgment and insight is normal.         6/14/2023     7:41 AM    SERVICES   Is patient using  services for this encounter? Yes   Language Interpreted Mauritanian    Name Supriya MA    Mode Inhouse    Content of Interpretation (select all) History/Visit information;Patient Education;Consent;Orders;Medications;Other;Discharge Instructions (AVS)      Assessment/Plan:  1. Establishing care with new doctor, encounter for    2. Pain in both feet  New to examiner, chronic and worsening for the patient. Plan for patient to complete right foot xray, will notify her of results through [employer] when received. Referral to " podiatry.  - Referral to Podiatry  - DX-FOOT-COMPLETE 3+ RIGHT; Future    3. Polyarthralgia  New to examiner, chronic for patient. Plan for patient to complete bilateral wrist and knee xray's, will notify her of results through [employer] when received. New prescription for meloxicam 15 mg once daily sent to pharmacy. Do not take other NSAID's while taking meloxicam, tylenol is okay.   - DX-WRIST-LIMITED 2- LEFT; Future  - DX-WRIST-LIMITED 2- RIGHT; Future  - DX-KNEE 3 VIEWS LEFT; Future  - DX-KNEE 3 VIEWS RIGHT; Future  - meloxicam (MOBIC) 15 MG tablet; Take 1 Tablet by mouth every day.  Dispense: 90 Tablet; Refill: 3    4. Vitamin D deficiency  New to examiner, chronic for patient. Continue vitamin d 2000 units daily. Due for annual labs in June 2024.    5. Iron deficiency anemia secondary to inadequate dietary iron intake  New to examiner, chronic/stable for patient. Discontinue OTC iron supplement. Due for annual labs in June 2024.    6. Screening for colorectal cancer  Due for screening.  - COLOGUARD (FIT DNA)    7. Need for vaccination  Given today.  - Tdap Vaccine =>8YO IM     Educated in proper administration of medication(s) ordered today including safety, possible SE, risks, benefits, rationale and alternatives to therapy.   Supportive care, differential diagnoses, and indications for immediate follow-up discussed with patient.    Pathogenesis of diagnosis discussed including typical length and natural progression.    Instructed to return to clinic or nearest emergency department for any change in condition, further concerns, or worsening of symptoms.  Patient states understanding of the plan of care and discharge instructions.    Return for Preventative Annual, Pap.    I have placed the below orders and discussed them with an approved delegating provider. The MA is performing the below orders under the direction of Dr. Bhagat.     Please note that this dictation was created using voice recognition software.  I have made every reasonable attempt to correct obvious errors, but I expect that there are errors of grammar and possibly content that I did not discover before finalizing the note.

## 2023-06-14 NOTE — ASSESSMENT & PLAN NOTE
New to examiner, chronic for patient. Reports having pain in right shoulder, bilateral elbows, wrists, and knees. States that the aches started about 13 years ago after having her last child. Her wrists and knees are the most bothersome. 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 Was prescribed meloxicam for sciatica in the past and this was helpful for her pain, tylenol does not help.

## 2023-06-14 NOTE — ASSESSMENT & PLAN NOTE
New to examiner, chronic for patient. Currently taking OTC iron supplement. CBC stable. Due for annual labs in June 2024

## 2023-07-22 ENCOUNTER — TELEPHONE (OUTPATIENT)
Dept: URGENT CARE | Facility: PHYSICIAN GROUP | Age: 53
End: 2023-07-22
Payer: COMMERCIAL

## 2023-07-22 NOTE — TELEPHONE ENCOUNTER
Phone Number Called: [phone] (home) [phone] (work)      Call outcome: Left detailed message for patient. Informed to call back with any additional questions.    Message: Called and left voicemail with pt informing her of her second no show. I explained its important to call and cancel the appointment rather than do a no show as the pt could be discharged from the providers care on the third no show.

## 2023-07-22 NOTE — LETTER
7/22/2023            [place] [address]              Dear Clara,    Your care is very important to us, and we have noticed that on 07/20/2023, you missed your appointment with Miranda TOWNSEND at [facility]       We’re committed to providing you with the best care possible. Your appointment time is reserved for you and your provider to discuss any current or new health concerns and, together, determine the best plan of care for you. Please call [phone] to reschedule at your earliest convenience.        In some cases, [facility] offers additional resources to make your healthcare more accessible, including transportation assistance, financial assistance and virtual visits. To learn more about these resources, please call [phone].       In order to keep you as informed as possible, below is a brief summary of our policy regarding missed appointments:        If a patient “No Shows”??three (3) or more appointments within a rolling 12-month           period, they may be dismissed from the practice for failure to follow clinician      recommendations.     If you have any concerns regarding the care you are receiving, please talk with your provider or call the office at [phone] and request to speak with the Practice . We’re committed to providing excellent care, and your feedback is invaluable.          Sincerely,  Miranda TOWNSEND

## 2023-12-18 ENCOUNTER — HOSPITAL ENCOUNTER (OUTPATIENT)
Facility: MEDICAL CENTER | Age: 53
End: 2023-12-18
Attending: NURSE PRACTITIONER
Payer: COMMERCIAL

## 2023-12-18 ENCOUNTER — OFFICE VISIT (OUTPATIENT)
Dept: MEDICAL GROUP | Facility: PHYSICIAN GROUP | Age: 53
End: 2023-12-18
Payer: COMMERCIAL

## 2023-12-18 VITALS
BODY MASS INDEX: 39.01 KG/M2 | OXYGEN SATURATION: 93 % | SYSTOLIC BLOOD PRESSURE: 124 MMHG | RESPIRATION RATE: 14 BRPM | HEART RATE: 78 BPM | TEMPERATURE: 97.7 F | WEIGHT: 212 LBS | DIASTOLIC BLOOD PRESSURE: 78 MMHG | HEIGHT: 62 IN

## 2023-12-18 DIAGNOSIS — Z00.00 ROUTINE HEALTH MAINTENANCE: ICD-10-CM

## 2023-12-18 DIAGNOSIS — Z12.4 SCREENING FOR MALIGNANT NEOPLASM OF CERVIX: ICD-10-CM

## 2023-12-18 DIAGNOSIS — Z11.51 SCREENING FOR HPV (HUMAN PAPILLOMAVIRUS): ICD-10-CM

## 2023-12-18 DIAGNOSIS — D50.8 IRON DEFICIENCY ANEMIA SECONDARY TO INADEQUATE DIETARY IRON INTAKE: ICD-10-CM

## 2023-12-18 DIAGNOSIS — Z13.220 SCREENING FOR LIPID DISORDERS: ICD-10-CM

## 2023-12-18 DIAGNOSIS — Z23 NEED FOR VACCINATION: ICD-10-CM

## 2023-12-18 DIAGNOSIS — Z11.4 SCREENING FOR HIV WITHOUT PRESENCE OF RISK FACTORS: ICD-10-CM

## 2023-12-18 DIAGNOSIS — Z12.31 ENCOUNTER FOR SCREENING MAMMOGRAM FOR BREAST CANCER: ICD-10-CM

## 2023-12-18 DIAGNOSIS — Z01.419 WELL WOMAN EXAM WITH ROUTINE GYNECOLOGICAL EXAM: ICD-10-CM

## 2023-12-18 DIAGNOSIS — E66.09 CLASS 2 OBESITY DUE TO EXCESS CALORIES WITHOUT SERIOUS COMORBIDITY WITH BODY MASS INDEX (BMI) OF 38.0 TO 38.9 IN ADULT: ICD-10-CM

## 2023-12-18 DIAGNOSIS — J01.40 ACUTE NON-RECURRENT PANSINUSITIS: ICD-10-CM

## 2023-12-18 DIAGNOSIS — E55.9 VITAMIN D DEFICIENCY: ICD-10-CM

## 2023-12-18 PROBLEM — E66.812 CLASS 2 OBESITY DUE TO EXCESS CALORIES WITHOUT SERIOUS COMORBIDITY WITH BODY MASS INDEX (BMI) OF 38.0 TO 38.9 IN ADULT: Status: ACTIVE | Noted: 2023-12-18

## 2023-12-18 PROCEDURE — 87624 HPV HI-RISK TYP POOLED RSLT: CPT

## 2023-12-18 PROCEDURE — 90471 IMMUNIZATION ADMIN: CPT | Performed by: NURSE PRACTITIONER

## 2023-12-18 PROCEDURE — 99000 SPECIMEN HANDLING OFFICE-LAB: CPT | Performed by: NURSE PRACTITIONER

## 2023-12-18 PROCEDURE — 99396 PREV VISIT EST AGE 40-64: CPT | Mod: 25 | Performed by: NURSE PRACTITIONER

## 2023-12-18 PROCEDURE — 90686 IIV4 VACC NO PRSV 0.5 ML IM: CPT | Performed by: NURSE PRACTITIONER

## 2023-12-18 PROCEDURE — 3078F DIAST BP <80 MM HG: CPT | Performed by: NURSE PRACTITIONER

## 2023-12-18 PROCEDURE — 3074F SYST BP LT 130 MM HG: CPT | Performed by: NURSE PRACTITIONER

## 2023-12-18 PROCEDURE — 88175 CYTOPATH C/V AUTO FLUID REDO: CPT

## 2023-12-18 RX ORDER — AMOXICILLIN AND CLAVULANATE POTASSIUM 875; 125 MG/1; MG/1
1 TABLET, FILM COATED ORAL 2 TIMES DAILY
Qty: 14 TABLET | Refills: 0 | Status: SHIPPED | OUTPATIENT
Start: 2023-12-18 | End: 2023-12-25

## 2023-12-18 ASSESSMENT — FIBROSIS 4 INDEX: FIB4 SCORE: 0.8

## 2023-12-18 NOTE — PROGRESS NOTES
Subjective:   CC:   Chief Complaint   Patient presents with    Gynecologic Exam    Sinusitis     HPI:   Clara Coombs is a 53 y.o. female who presents for annual exam:     Anticipatory Guidance:  Cholesterol screenin2023   LDL controlled: 80  Diabetes screenin2023   A1c controlled: 4.9% on 3/18/2022  Diet: Recommend more lean meats, fruits, vegetables, whole grains.   Exercise: Encourage regular exercise. Gym at work.  Substance abuse: No  Safe in relationship: Yes  Seatbelts, bike/motorcycle helmet: Yes  Sun protection: Yes  Dentist: Up to date   Eye doctor: Due for follow up    Cancer Screening:  Colorectal cancer screenin2023 Cologuard  Cervical cancer screenin2023, last 3 years ago  Breast cancer screenin2022    Infectious Disease Screening/Immunizations:  STI screening: Declines  Chlamydia/Gonorrhea screening: Declines  Hep C screening: 3/18/2022  HIV screen: Ordered today    Immunizations:   Influenza: 2023    Tetanus:    Pneumonia: Due at age 65  RSV: N/A  Shingrix: No   Received HPV series: Aged out    Preventative Care Screening:   Osteoporosis Screening: NA, due at age 65  Tobacco Screening: Never smoker     AAA Screening: NA    Patient's last menstrual period was 2023 (exact date).   Hx STDs: No  Birth control: None  Menses every month with 3 days with heavy bleeding.  Reports moderate cramping and does take OTC analgesics for cramps.  No significant bloating/fluid retention, pelvic pain, or dyspareunia. No abnormal vaginal discharge.  No breast tenderness, mass, nipple discharge, changes in size or contour, or abnormal cyclic discomfort.    OB History    Para Term  AB Living   6 6 4 2 0 6   SAB IAB Ectopic Molar Multiple Live Births   0 0 0 0 0 6     She  reports being sexually active and has had partner(s) who are male.  She  has a past medical history of Anemia, Menometrorrhagia (2022), and Vitamin D deficiency.  She  has a past  surgical history that includes cholecystectomy; appendectomy; tubal coagulation laparoscopic bilateral; bunionectomy (Right); and primary c section.    Family History   Problem Relation Age of Onset    Diabetes Mother     No Known Problems Father     Genitourinary () Problems Sister         heavy periods    No Known Problems Sister     No Known Problems Maternal Uncle     No Known Problems Maternal Uncle     No Known Problems Maternal Uncle     No Known Problems Maternal Uncle     No Known Problems Paternal Aunt     No Known Problems Paternal Aunt     No Known Problems Paternal Uncle     No Known Problems Paternal Uncle     No Known Problems Paternal Uncle     No Known Problems Paternal Uncle     No Known Problems Paternal Uncle     No Known Problems Paternal Uncle     Diabetes Maternal Grandmother     Cancer Maternal Grandfather     Colon Cancer Maternal Grandfather     No Known Problems Paternal Grandmother     No Known Problems Paternal Grandfather     No Known Problems Daughter     Colon Cancer Daughter         faints with menses    No Known Problems Daughter     Seizures Son     No Known Problems Son     No Known Problems Son      Social History     Tobacco Use    Smoking status: Never     Passive exposure: Current    Smokeless tobacco: Never    Tobacco comments:     Works in a [employer]   Vaping Use    Vaping Use: Never used   Substance Use Topics    Alcohol use: Not Currently     Comment: occasional 1-2 drinks - holidays    Drug use: Never     Patient Active Problem List    Diagnosis Date Noted    Class 2 obesity due to excess calories without serious comorbidity with body mass index (BMI) of 38.0 to 38.9 in adult 12/18/2023    Primary osteoarthritis of both knees 06/14/2023    Pain in both feet 06/13/2023    Polyarthralgia 06/13/2023    Iron deficiency anemia secondary to inadequate dietary iron intake 04/29/2022    Vitamin D deficiency 03/18/2022    Allergy 01/02/2020     Current Outpatient Medications  "  Medication Sig Dispense Refill    amoxicillin-clavulanate (AUGMENTIN) 875-125 MG Tab Take 1 Tablet by mouth 2 times a day for 7 days. 14 Tablet 0    Cholecalciferol (VITAMIN D) 50 MCG (2000 UT) Cap Take 2,000 Units by mouth every day.      meloxicam (MOBIC) 15 MG tablet Take 1 Tablet by mouth every day. 90 Tablet 3     No current facility-administered medications for this visit.     No Known Allergies    Review of Systems   Constitutional: Negative for fever, chills and malaise/fatigue.   HENT: Positive for congestion.    Eyes: Negative for pain.   Respiratory: Negative for cough and shortness of breath.    Cardiovascular: Negative for chest pain and leg swelling.   Gastrointestinal: Negative for nausea, vomiting, abdominal pain and diarrhea.   Genitourinary: Negative for dysuria and hematuria.   Skin: Negative for rash.   Neurological: Negative for dizziness, focal weakness and headaches.   Endo/Heme/Allergies: Does not bruise/bleed easily.   Psychiatric/Behavioral: Negative for depression.  The patient is not nervous/anxious.      Objective:   /78 (BP Location: Left arm, Patient Position: Sitting, BP Cuff Size: Large adult)   Pulse 78   Temp 36.5 °C (97.7 °F) (Temporal)   Resp 14   Ht 1.581 m (5' 2.25\")   Wt 96.2 kg (212 lb)   LMP 08/25/2023 (Exact Date)   SpO2 93%   Breastfeeding No   BMI 38.46 kg/m²     Wt Readings from Last 4 Encounters:   12/18/23 96.2 kg (212 lb)   06/13/23 96.2 kg (212 lb)   12/12/22 88.5 kg (195 lb)   06/03/22 73.5 kg (162 lb)     A chaperone was offered to the patient during today's exam. Chaperone name: Maribel Reyes MA was present.    Physical Exam:  Constitutional: Well-developed and well-nourished. Not diaphoretic. No distress.   Skin: Skin is warm and dry. No rash noted.  Head: Atraumatic without lesions.  Maxillary and frontal sinus tenderness to palpation.  Eyes: Conjunctivae and extraocular motions are normal. Pupils are equal, round, and reactive to light. No " scleral icterus.   Ears:  External ears unremarkable. Tympanic membranes clear and intact.  Nose: Sinus congestion.  Nares patent. Septum midline. Turbinates without erythema nor edema. No discharge.   Mouth/Throat: Postnasal drainage.  Tongue normal. Oropharynx is clear and moist. Posterior pharynx without erythema or exudates.  Neck: Supple, trachea midline. Normal range of motion. No thyromegaly present. No lymphadenopathy--cervical or supraclavicular.  Cardiovascular: Regular rate and rhythm, S1 and S2 without murmur, rubs, or gallops.    Respiratory: Effort normal. Clear to auscultation throughout. No adventitious sounds.   Breast:  Breast exam deferred. Discussed monthly self exams and what to look for, including peau d'orange or nipple retraction, discharge, breasts moving freely and equally without restriction, axillary/supraclavicular adenopathy, or palpable masses/nodules.  Abdomen: Soft, non tender, and without distention. Active bowel sounds in all four quadrants. No rebound, guarding.  : Perineum and external genitalia normal without rash. Vagina with white, thin, and odorless discharge. Cervix without visible lesions or discharge. Bimanual exam without adnexal masses or cervical motion tenderness.  Extremities: No cyanosis, clubbing, erythema, nor edema. Radial pulses intact and symmetric.   Musculoskeletal: All major joints AROM full in all directions without pain.  Neurological: Alert and oriented x 3. Grossly non-focal. Strength and sensation grossly intact.   Psychiatric:  Behavior, mood, and affect are appropriate.    Assessment and Plan:   1. Well woman exam with routine gynecological exam  2. Screening for malignant neoplasm of cervix  3. Screening for HPV (human papillomavirus)  - THINPREP PAP WITH HPV; Future    4. Class 2 obesity due to excess calories without serious comorbidity with body mass index (BMI) of 38.0 to 38.9 in adult  Chronic, ongoing.  Encourage diet high in fruits,  vegetables, and fiber. And a diet low in salt, refined carbohydrates, cholesterol, saturated fat, and trans fatty acids.    Encourage a minimum of 30 minutes of moderate intensity aerobic exercise (eg, brisk walking) is recommended on five days each week. Or 30 minutes of vigorous-intensity aerobic exercise (eg, jogging) on three days each week.   Patient's body mass index is 38.46 kg/m². Exercise and nutrition counseling were performed at this visit.  Due for updated annual labs in June 2024.  - CBC WITH DIFFERENTIAL; Future  - Comp Metabolic Panel; Future  - Lipid Profile; Future  - TSH WITH REFLEX TO FT4; Future  - Patient identified as having weight management issue.  Appropriate orders and counseling given.    5. Vitamin D deficiency  Chronic, ongoing.  Continue vitamin D 2000 units daily. Due for updated annual labs in June 2024.  - VITAMIN D,25 HYDROXY (DEFICIENCY); Future    6. Iron deficiency anemia secondary to inadequate dietary iron intake  Chronic, ongoing. Due for updated annual labs in June 2024.  - CBC WITH DIFFERENTIAL; Future  - IRON/TOTAL IRON BIND; Future    7. Acute non-recurrent pansinusitis  New to examiner.  Patient reports symptoms started 4 days ago.  She has sinus congestion and headache, postnasal drainage, sore throat.  Start Augmentin 875-125 mg twice daily for 7 days.  Take entire course of antibiotics even if symptoms improve.  Take antibiotic with food if stomach upset occurs.  Return to be seen if symptoms worsen or do not improve with antibiotic therapy.  - amoxicillin-clavulanate (AUGMENTIN) 875-125 MG Tab; Take 1 Tablet by mouth 2 times a day for 7 days.  Dispense: 14 Tablet; Refill: 0    8. Screening for lipid disorders  Due for updated annual labs in June 2024.  - Comp Metabolic Panel; Future  - Lipid Profile; Future    9. Routine health maintenance  Due for updated annual labs in June 2024.  - CBC WITH DIFFERENTIAL; Future  - Comp Metabolic Panel; Future  - HIV AG/AB COMBO  ASSAY SCREENING; Future  - Lipid Profile; Future  - TSH WITH REFLEX TO FT4; Future  - VITAMIN D,25 HYDROXY (DEFICIENCY); Future  - THINPREP PAP WITH HPV; Future  - IRON/TOTAL IRON BIND; Future    10. Screening for HIV without presence of risk factors  Due for one-time screening.  - HIV AG/AB COMBO ASSAY SCREENING; Future    11. Encounter for screening mammogram for breast cancer  Due for screening.  - MA-SCREENING MAMMO BILAT W/TOMOSYNTHESIS W/CAD; Future    12. Need for vaccination  Given today.  - INFLUENZA VACCINE QUAD INJ (PF)     Health maintenance: Up-to-date  Labs per orders  Immunizations: Per orders  Patient counseled about skin care, diet, supplements, and exercise.  Discussed  breast self exam, mammography screening, menopause, osteoporosis, adequate intake of calcium and vitamin D, diet and exercise, colorectal cancer screening.     Follow-up: Return in about 6 months (around 6/18/2024) for Follow up Labs.     I have placed the below orders and discussed them with an approved delegating provider. The MA is performing the below orders under the direction of Dr. Bhagat.      Please note that this dictation was created using voice recognition software. I have worked with consultants from the vendor as well as technical experts from [employer] to optimize the interface. I have made every reasonable attempt to correct obvious errors, but I expect that there are errors of grammar and possibly content that I did not discover before finalizing the note.

## 2023-12-21 DIAGNOSIS — A59.01 TRICHOMONAS VAGINITIS: ICD-10-CM

## 2023-12-21 LAB
CYTOLOGIST CVX/VAG CYTO: NORMAL
CYTOLOGY CVX/VAG DOC CYTO: NORMAL
CYTOLOGY CVX/VAG DOC THIN PREP: NORMAL
HPV I/H RISK 4 DNA CVX QL PROBE+SIG AMP: NEGATIVE
NOTE NL11727A: NORMAL
OTHER STN SPEC: NORMAL
QC REVIEWED BY NL11722A: NORMAL
STAT OF ADQ CVX/VAG CYTO-IMP: NORMAL

## 2023-12-21 RX ORDER — METRONIDAZOLE 500 MG/1
500 TABLET ORAL 2 TIMES DAILY
Qty: 14 TABLET | Refills: 0 | Status: SHIPPED | OUTPATIENT
Start: 2023-12-21 | End: 2023-12-28

## 2023-12-22 NOTE — PROGRESS NOTES
1. Trichomonas vaginitis  - metroNIDAZOLE (FLAGYL) 500 MG Tab; Take 1 Tablet by mouth 2 times a day for 7 days.  Dispense: 14 Tablet; Refill: 0

## 2024-03-08 ENCOUNTER — HOSPITAL ENCOUNTER (OUTPATIENT)
Dept: LAB | Facility: MEDICAL CENTER | Age: 54
End: 2024-03-08
Attending: STUDENT IN AN ORGANIZED HEALTH CARE EDUCATION/TRAINING PROGRAM
Payer: COMMERCIAL

## 2024-03-08 ENCOUNTER — OFFICE VISIT (OUTPATIENT)
Dept: MEDICAL GROUP | Facility: PHYSICIAN GROUP | Age: 54
End: 2024-03-08
Payer: COMMERCIAL

## 2024-03-08 VITALS
TEMPERATURE: 97.6 F | HEART RATE: 76 BPM | SYSTOLIC BLOOD PRESSURE: 118 MMHG | HEIGHT: 63 IN | DIASTOLIC BLOOD PRESSURE: 72 MMHG | WEIGHT: 211 LBS | BODY MASS INDEX: 37.39 KG/M2 | OXYGEN SATURATION: 95 %

## 2024-03-08 DIAGNOSIS — M25.50 POLYARTHRALGIA: ICD-10-CM

## 2024-03-08 DIAGNOSIS — M25.562 CHRONIC PAIN OF BOTH KNEES: ICD-10-CM

## 2024-03-08 DIAGNOSIS — G89.29 CHRONIC PAIN OF BOTH KNEES: ICD-10-CM

## 2024-03-08 DIAGNOSIS — M54.50 CHRONIC BILATERAL LOW BACK PAIN WITHOUT SCIATICA: ICD-10-CM

## 2024-03-08 DIAGNOSIS — G89.29 CHRONIC BILATERAL LOW BACK PAIN WITHOUT SCIATICA: ICD-10-CM

## 2024-03-08 DIAGNOSIS — M25.561 CHRONIC PAIN OF BOTH KNEES: ICD-10-CM

## 2024-03-08 LAB
CRP SERPL HS-MCNC: <0.3 MG/DL (ref 0–0.75)
RHEUMATOID FACT SER IA-ACNC: <10 IU/ML (ref 0–14)

## 2024-03-08 PROCEDURE — 99214 OFFICE O/P EST MOD 30 MIN: CPT | Performed by: STUDENT IN AN ORGANIZED HEALTH CARE EDUCATION/TRAINING PROGRAM

## 2024-03-08 PROCEDURE — 86038 ANTINUCLEAR ANTIBODIES: CPT

## 2024-03-08 PROCEDURE — 3074F SYST BP LT 130 MM HG: CPT | Performed by: STUDENT IN AN ORGANIZED HEALTH CARE EDUCATION/TRAINING PROGRAM

## 2024-03-08 PROCEDURE — 86140 C-REACTIVE PROTEIN: CPT

## 2024-03-08 PROCEDURE — 36415 COLL VENOUS BLD VENIPUNCTURE: CPT

## 2024-03-08 PROCEDURE — 86431 RHEUMATOID FACTOR QUANT: CPT

## 2024-03-08 PROCEDURE — 3078F DIAST BP <80 MM HG: CPT | Performed by: STUDENT IN AN ORGANIZED HEALTH CARE EDUCATION/TRAINING PROGRAM

## 2024-03-08 PROCEDURE — 85652 RBC SED RATE AUTOMATED: CPT

## 2024-03-08 RX ORDER — MELOXICAM 15 MG/1
15 TABLET ORAL DAILY
Qty: 90 TABLET | Refills: 0 | Status: SHIPPED | OUTPATIENT
Start: 2024-03-08

## 2024-03-08 ASSESSMENT — FIBROSIS 4 INDEX: FIB4 SCORE: 0.8

## 2024-03-08 ASSESSMENT — PATIENT HEALTH QUESTIONNAIRE - PHQ9: CLINICAL INTERPRETATION OF PHQ2 SCORE: 0

## 2024-03-08 NOTE — LETTER
March 8, 2024    To Whom It May Concern:         This is confirmation that Clara Coombs attended her scheduled appointment with Vanessa Bhagat M.D. on 3/08/24.  Please excuse the patient from work 3/5/24 - 3/9/24.  Patient will return to work on 3/12/24.         If you have any questions please do not hesitate to call me at the phone number listed below.    Sincerely,          Vanessa Bhagat M.D.  [phone]

## 2024-03-08 NOTE — PROGRESS NOTES
"Subjective:     Chief Complaint   Patient presents with    Leg Pain     X 1 month ago everyday     Knee Pain     Swelling in knees, no injury     Joint Swelling     Knees and arms         HPI:   Clara is a patient of Miranda Sanchez that presents today with bilateral knee pain and swelling in knees/elbows.  Chart review indicates history of polyarthralgia.  June 2023 x-ray showed moderate medial compartment OA and right knee and mild medial compartment OA and left knee.  Patient follows up with Ortho and reports recent injections did not provide any relief.  Patient reports insurance will not cover aqua therapy.  Patient was previously on Mobic 15 mg daily which helped with the pain.  Patient thinks her mother may have rheumatoid arthritis.  Patient complains of some warmth and swelling in the knees after a long day at work.  Patient missed work the last few days due to knee pain and is requesting a note today.      ROS:  Negative except as stated above.      Objective:     Exam:  /72 (BP Location: Left arm, Patient Position: Sitting, BP Cuff Size: Large adult)   Pulse 76   Temp 36.4 °C (97.6 °F) (Temporal)   Ht 1.6 m (5' 3\")   Wt 95.7 kg (211 lb)   SpO2 95%   BMI 37.38 kg/m²  Body mass index is 37.38 kg/m².    Physical Exam    MSK: Minimal swelling in elbows and knees bilaterally with no erythema or warmth.    Assessment & Plan:     53 y.o. female with the following -     1. Polyarthralgia  Chronic, uncontrolled.  Patient reports mother may have RA and workup ordered to rule out inflammatory arthritis.  Prescribed Mobic 15 mg daily as needed.  - Sed Rate; Future  - CRP QUANTITIVE (NON-CARDIAC); Future  - EMILY W/REFLEX IF POSITIVE  - RHEUMATOID ARTHRITIS FACTOR; Future  - meloxicam (MOBIC) 15 MG tablet; Take 1 Tablet by mouth every day.  Dispense: 90 Tablet; Refill: 0    2. Chronic pain of both knees  Chronic, uncontrolled.  X-rays show OA.  Follows up with Ortho and injections provided no relief.  Aqua " therapy was not covered by insurance and given referral for PT.  Prescribed Mobic 15 mg daily as needed.  - Referral to Physical Therapy    3. Chronic bilateral low back pain without sciatica  - Referral to Physical Therapy          Return if symptoms worsen or fail to improve.    Please note that this dictation was created using voice recognition software. I have made every reasonable attempt to correct obvious errors, but I expect that there are errors of grammar and possibly content that I did not discover before finalizing the note.

## 2024-03-09 LAB — ERYTHROCYTE [SEDIMENTATION RATE] IN BLOOD BY WESTERGREN METHOD: 5 MM/HOUR (ref 0–25)

## 2024-03-10 LAB — NUCLEAR IGG SER QL IA: NORMAL

## 2024-10-16 DIAGNOSIS — M17.0 PRIMARY OSTEOARTHRITIS OF BOTH KNEES: ICD-10-CM

## 2024-10-16 DIAGNOSIS — G89.29 CHRONIC BILATERAL LOW BACK PAIN WITHOUT SCIATICA: ICD-10-CM

## 2024-10-16 DIAGNOSIS — M25.562 CHRONIC PAIN OF BOTH KNEES: ICD-10-CM

## 2024-10-16 DIAGNOSIS — M79.671 PAIN IN BOTH FEET: ICD-10-CM

## 2024-10-16 DIAGNOSIS — M54.50 CHRONIC BILATERAL LOW BACK PAIN WITHOUT SCIATICA: ICD-10-CM

## 2024-10-16 DIAGNOSIS — M25.561 CHRONIC PAIN OF BOTH KNEES: ICD-10-CM

## 2024-10-16 DIAGNOSIS — M25.50 POLYARTHRALGIA: ICD-10-CM

## 2024-10-16 DIAGNOSIS — M79.672 PAIN IN BOTH FEET: ICD-10-CM

## 2024-10-16 DIAGNOSIS — G89.29 CHRONIC PAIN OF BOTH KNEES: ICD-10-CM

## 2024-10-16 RX ORDER — MELOXICAM 15 MG/1
15 TABLET ORAL DAILY
Qty: 90 TABLET | Refills: 0 | Status: CANCELLED | OUTPATIENT
Start: 2024-10-16

## 2024-10-16 RX ORDER — MELOXICAM 15 MG/1
15 TABLET ORAL DAILY
Qty: 90 TABLET | Refills: 0 | Status: SHIPPED | OUTPATIENT
Start: 2024-10-16

## 2024-11-14 ENCOUNTER — APPOINTMENT (OUTPATIENT)
Dept: RADIOLOGY | Facility: MEDICAL CENTER | Age: 54
End: 2024-11-14
Attending: STUDENT IN AN ORGANIZED HEALTH CARE EDUCATION/TRAINING PROGRAM
Payer: COMMERCIAL

## 2024-11-14 ENCOUNTER — HOSPITAL ENCOUNTER (EMERGENCY)
Facility: MEDICAL CENTER | Age: 54
End: 2024-11-14
Attending: STUDENT IN AN ORGANIZED HEALTH CARE EDUCATION/TRAINING PROGRAM
Payer: COMMERCIAL

## 2024-11-14 VITALS
HEART RATE: 79 BPM | WEIGHT: 216.49 LBS | OXYGEN SATURATION: 92 % | HEIGHT: 66 IN | SYSTOLIC BLOOD PRESSURE: 132 MMHG | DIASTOLIC BLOOD PRESSURE: 60 MMHG | RESPIRATION RATE: 16 BRPM | BODY MASS INDEX: 34.79 KG/M2 | TEMPERATURE: 97.5 F

## 2024-11-14 DIAGNOSIS — M54.31 SCIATICA OF RIGHT SIDE: ICD-10-CM

## 2024-11-14 DIAGNOSIS — M79.671 RIGHT FOOT PAIN: ICD-10-CM

## 2024-11-14 PROCEDURE — 700102 HCHG RX REV CODE 250 W/ 637 OVERRIDE(OP): Performed by: STUDENT IN AN ORGANIZED HEALTH CARE EDUCATION/TRAINING PROGRAM

## 2024-11-14 PROCEDURE — A9270 NON-COVERED ITEM OR SERVICE: HCPCS | Performed by: STUDENT IN AN ORGANIZED HEALTH CARE EDUCATION/TRAINING PROGRAM

## 2024-11-14 PROCEDURE — 93971 EXTREMITY STUDY: CPT | Mod: RT

## 2024-11-14 PROCEDURE — 700101 HCHG RX REV CODE 250: Performed by: STUDENT IN AN ORGANIZED HEALTH CARE EDUCATION/TRAINING PROGRAM

## 2024-11-14 PROCEDURE — 73600 X-RAY EXAM OF ANKLE: CPT | Mod: RT

## 2024-11-14 PROCEDURE — 99283 EMERGENCY DEPT VISIT LOW MDM: CPT

## 2024-11-14 RX ORDER — LIDOCAINE 4 G/G
1 PATCH TOPICAL EVERY 24 HOURS
Status: DISCONTINUED | OUTPATIENT
Start: 2024-11-14 | End: 2024-11-14 | Stop reason: HOSPADM

## 2024-11-14 RX ORDER — ACETAMINOPHEN 500 MG
1000 TABLET ORAL ONCE
Status: COMPLETED | OUTPATIENT
Start: 2024-11-14 | End: 2024-11-14

## 2024-11-14 RX ORDER — LIDOCAINE 4 G/G
1 PATCH TOPICAL EVERY 24 HOURS
Qty: 10 PATCH | Refills: 0 | Status: SHIPPED | OUTPATIENT
Start: 2024-11-14

## 2024-11-14 RX ADMIN — ACETAMINOPHEN 1000 MG: 500 TABLET ORAL at 18:31

## 2024-11-14 RX ADMIN — LIDOCAINE 1 PATCH: 4 PATCH TOPICAL at 18:32

## 2024-11-14 ASSESSMENT — FIBROSIS 4 INDEX: FIB4 SCORE: 0.81

## 2024-11-15 NOTE — DISCHARGE INSTRUCTIONS
You can try the lidocaine patches as needed for pain.  You can also take acetaminophen.  Stop taking ibuprofen when you are taking meloxicam

## 2024-11-15 NOTE — ED NOTES
Emergency room physician at bedside. Patient agrees wit plan of care discussed by Dr. Willis. Lydia in low position, side rail up for patient safety. Call light within reach. Plan of care ongoing.

## 2024-11-15 NOTE — ED TRIAGE NOTES
"Chief Complaint   Patient presents with    Leg Pain     Pt reports pain in right knee for a week but now the pain goes from hip to ankle   Pt states that there is swelling in her ankle     BP (!) 152/96   Pulse 81   Temp 36.6 °C (97.8 °F) (Temporal)   Resp 16   Ht 1.676 m (5' 6\")   Wt 98.2 kg (216 lb 7.9 oz)   LMP 08/25/2023 (Exact Date)   SpO2 91%   BMI 34.94 kg/m²     "

## 2024-11-15 NOTE — ED PROVIDER NOTES
ED Provider Note    CHIEF COMPLAINT  Chief Complaint   Patient presents with    Leg Pain     Pt reports pain in right knee for a week but now the pain goes from hip to ankle   Pt states that there is swelling in her ankle       EXTERNAL RECORDS REVIEWED  Outpatient Notes seen 3/8/2024, bilateral knee pain and swelling of knees and elbows, history of polyarthralgias, history of knee osteoarthritis, follows with Ortho receives intra articular injections for pain, labs were sent for rheumatoid workup at that time.  I reviewed these labs which appear essentially negative.    HPI/ROS    OUTSIDE HISTORIAN(S):  Family daughter helps provide history    Clara Coombs is a 54 y.o. female who presents with atraumatic pain, in her foot, also tracks to her lower back and right knee and lateral thigh.  She feels her foot is swollen.  No fever no chills.  No nausea no vomiting.  No traumatic injury.  No loss of bowel control bladder control no saddle paresthesias.  Paresthesias in the foot are present.  This has been going on for about 1 week.  She notes her back pain is unchanged.  She takes meloxicam and ibuprofen for her symptoms we discussed not taking these 2 medications together.    PAST MEDICAL HISTORY   has a past medical history of Anemia, Menometrorrhagia (4/29/2022), and Vitamin D deficiency.    SURGICAL HISTORY   has a past surgical history that includes cholecystectomy; appendectomy; tubal coagulation laparoscopic bilateral; bunionectomy (Right); and primary c section.    FAMILY HISTORY  Family History   Problem Relation Age of Onset    Diabetes Mother     No Known Problems Father     Genitourinary () Problems Sister         heavy periods    No Known Problems Sister     No Known Problems Maternal Uncle     No Known Problems Maternal Uncle     No Known Problems Maternal Uncle     No Known Problems Maternal Uncle     No Known Problems Paternal Aunt     No Known Problems Paternal Aunt     No Known Problems Paternal  "Uncle     No Known Problems Paternal Uncle     No Known Problems Paternal Uncle     No Known Problems Paternal Uncle     No Known Problems Paternal Uncle     No Known Problems Paternal Uncle     Diabetes Maternal Grandmother     Cancer Maternal Grandfather     Colon Cancer Maternal Grandfather     No Known Problems Paternal Grandmother     No Known Problems Paternal Grandfather     No Known Problems Daughter     Colon Cancer Daughter         faints with menses    No Known Problems Daughter     Seizures Son     No Known Problems Son     No Known Problems Son        SOCIAL HISTORY  Social History     Tobacco Use    Smoking status: Never     Passive exposure: Current    Smokeless tobacco: Never    Tobacco comments:     Works in a [employer]   Vaping Use    Vaping status: Never Used   Substance and Sexual Activity    Alcohol use: Not Currently     Comment: occasional 1-2 drinks - holidays    Drug use: Never    Sexual activity: Yes     Partners: Male       CURRENT MEDICATIONS  Home Medications    **Home medications have not yet been reviewed for this encounter**         ALLERGIES  No Known Allergies    PHYSICAL EXAM  VITAL SIGNS: /60   Pulse 79   Temp 36.4 °C (97.5 °F) (Temporal)   Resp 16   Ht 1.676 m (5' 6\")   Wt 98.2 kg (216 lb 7.9 oz)   LMP 08/25/2023 (Exact Date)   SpO2 92%   BMI 34.94 kg/m²    General: Pleasant female, uncomfortable appearing, nontoxic.  Head: Normocephalic atraumatic  Eyes: Extraocular motion intact  Neck: Supple, no rigidity  Cardiovascular: Regular rate and rhythm no murmurs rubs or gallops  Respiratory: Clear to auscultation bilaterally, equal chest rise and fall, no increased work of breathing  Plan: Diffuse lumbar tenderness paraspinal and midline, no Point tenderness.  Abdomen: Soft nontender no guarding  Musculoskeletal: Warm and well perfused.  Tenderness over the lateral malleolus.  2+ DP pulse.  Surgical scar over the first MTP.    Neuro:  Flexes and extends toes, hallices, " able to dorsiflex/plantar flex the ankle, sensation intact to light touch, deep, superficial peroneal, tibial, sural and plantar distributions.  Integumentary: No wounds or rashes      RADIOLOGY/PROCEDURES       Radiologist interpretation:  US-EXTREMITY VENOUS LOWER UNILAT RIGHT   Final Result         1.  No evidence of right lower extremity deep venous thrombosis.      DX-ANKLE 2- VIEWS RIGHT   Final Result      1.  No fracture or dislocation of RIGHT ankle.   2.  Lateral soft tissue swelling.          COURSE & MEDICAL DECISION MAKING    ASSESSMENT, COURSE AND PLAN  Care Narrative: Pleasant 54-year-old female presenting with back pain radiating to her left leg as well as atraumatic right ankle pain, right leg swelling.  Vitals are reassuring.  No chest pain or shortness of breath.  Exam is notable for tenderness over the ankle joint, with preserved range of motion no pain with short range of motion, no overlying skin changes, no focal neurologic deficit.  She does report diffuse paresthesias but has sensation.    Differential clues was not limited to: Sprain, DVT, sciatica, doubt cauda equina, doubt septic arthritis.    Patient feels better after Tylenol and lidocaine patch.  Imaging is negative.  Patient is agreeable to outpatient follow-up.  Discharged in no acute distress.  All questions answered prior to discharge.    Disposition:  Discharged with outpatient follow-up.      Decision tools and prescription drugs considered including, but not limited to: Pain Medications acetaminophen, lidocaine patches .    FINAL DIAGNOSIS  1. Sciatica of right side    2. Right foot pain         Electronically signed by: Niko Willis M.D., 11/14/2024 6:12 PM

## 2025-03-21 PROBLEM — M17.11 PRIMARY OSTEOARTHRITIS OF RIGHT KNEE: Status: ACTIVE | Noted: 2025-03-21

## 2025-05-27 DIAGNOSIS — E66.09 CLASS 2 OBESITY DUE TO EXCESS CALORIES WITHOUT SERIOUS COMORBIDITY WITH BODY MASS INDEX (BMI) OF 38.0 TO 38.9 IN ADULT: Primary | ICD-10-CM

## 2025-05-27 DIAGNOSIS — Z12.31 ENCOUNTER FOR SCREENING MAMMOGRAM FOR BREAST CANCER: ICD-10-CM

## 2025-05-27 DIAGNOSIS — E55.9 VITAMIN D DEFICIENCY: ICD-10-CM

## 2025-05-27 DIAGNOSIS — E66.812 CLASS 2 OBESITY DUE TO EXCESS CALORIES WITHOUT SERIOUS COMORBIDITY WITH BODY MASS INDEX (BMI) OF 38.0 TO 38.9 IN ADULT: Primary | ICD-10-CM

## 2025-05-27 DIAGNOSIS — D50.8 IRON DEFICIENCY ANEMIA SECONDARY TO INADEQUATE DIETARY IRON INTAKE: ICD-10-CM

## 2025-05-27 DIAGNOSIS — Z11.4 SCREENING FOR HIV WITHOUT PRESENCE OF RISK FACTORS: ICD-10-CM

## 2025-05-27 DIAGNOSIS — Z13.220 SCREENING FOR LIPID DISORDERS: ICD-10-CM

## 2025-05-27 DIAGNOSIS — Z00.00 ROUTINE HEALTH MAINTENANCE: ICD-10-CM

## 2025-05-27 NOTE — PROGRESS NOTES
1. Class 2 obesity due to excess calories without serious comorbidity with body mass index (BMI) of 38.0 to 38.9 in adult  - CBC WITH DIFFERENTIAL; Future  - Comp Metabolic Panel; Future  - TSH WITH REFLEX TO FT4; Future  - Lipid Profile; Future    2. Iron deficiency anemia secondary to inadequate dietary iron intake  - CBC WITH DIFFERENTIAL; Future  - IRON/TOTAL IRON BIND; Future  - VITAMIN B12; Future    3. Vitamin D deficiency  - VITAMIN D,25 HYDROXY (DEFICIENCY); Future    4. Routine health maintenance  - CBC WITH DIFFERENTIAL; Future  - Comp Metabolic Panel; Future  - IRON/TOTAL IRON BIND; Future  - VITAMIN D,25 HYDROXY (DEFICIENCY); Future  - VITAMIN B12; Future  - TSH WITH REFLEX TO FT4; Future  - Lipid Profile; Future  - HIV AG/AB COMBO ASSAY SCREENING; Future    5. Screening for HIV without presence of risk factors  - HIV AG/AB COMBO ASSAY SCREENING; Future    6. Encounter for screening mammogram for breast cancer  - MA-SCREENING MAMMO BILAT W/TOMOSYNTHESIS W/CAD; Future    7. Screening for lipid disorders  - Comp Metabolic Panel; Future  - TSH WITH REFLEX TO FT4; Future  - Lipid Profile; Future

## 2025-06-19 PROBLEM — M17.11 OSTEOARTHRITIS OF RIGHT KNEE: Status: ACTIVE | Noted: 2025-06-19

## 2025-06-27 PROBLEM — M20.41 HAMMER TOE OF RIGHT FOOT: Status: ACTIVE | Noted: 2025-06-27

## 2025-08-27 ENCOUNTER — OFFICE VISIT (OUTPATIENT)
Dept: MEDICAL GROUP | Facility: PHYSICIAN GROUP | Age: 55
End: 2025-08-27
Payer: COMMERCIAL

## 2025-08-27 VITALS
SYSTOLIC BLOOD PRESSURE: 128 MMHG | BODY MASS INDEX: 34.83 KG/M2 | HEART RATE: 58 BPM | OXYGEN SATURATION: 97 % | DIASTOLIC BLOOD PRESSURE: 86 MMHG | HEIGHT: 67 IN | WEIGHT: 221.9 LBS | TEMPERATURE: 97.1 F

## 2025-08-27 DIAGNOSIS — Z02.89 ENCOUNTER FOR COMPLETION OF FORM WITH PATIENT: ICD-10-CM

## 2025-08-27 DIAGNOSIS — M17.11 PRIMARY OSTEOARTHRITIS OF RIGHT KNEE: ICD-10-CM

## 2025-08-27 DIAGNOSIS — Z23 NEED FOR VACCINATION: ICD-10-CM

## 2025-08-27 DIAGNOSIS — Z00.00 ROUTINE HEALTH MAINTENANCE: ICD-10-CM

## 2025-08-27 ASSESSMENT — PATIENT HEALTH QUESTIONNAIRE - PHQ9: CLINICAL INTERPRETATION OF PHQ2 SCORE: 0

## 2025-08-27 ASSESSMENT — LIFESTYLE VARIABLES
HOW OFTEN DO YOU HAVE A DRINK CONTAINING ALCOHOL: NEVER
HOW MANY STANDARD DRINKS CONTAINING ALCOHOL DO YOU HAVE ON A TYPICAL DAY: PATIENT DOES NOT DRINK
SKIP TO QUESTIONS 9-10: 1
HOW OFTEN DO YOU HAVE SIX OR MORE DRINKS ON ONE OCCASION: NEVER
AUDIT-C TOTAL SCORE: 0